# Patient Record
Sex: FEMALE | Race: WHITE | Employment: OTHER | URBAN - NONMETROPOLITAN AREA
[De-identification: names, ages, dates, MRNs, and addresses within clinical notes are randomized per-mention and may not be internally consistent; named-entity substitution may affect disease eponyms.]

---

## 2021-02-23 ENCOUNTER — OFFICE VISIT (OUTPATIENT)
Dept: FAMILY MEDICINE CLINIC | Age: 71
End: 2021-02-23
Payer: MEDICARE

## 2021-02-23 VITALS
HEIGHT: 62 IN | HEART RATE: 65 BPM | OXYGEN SATURATION: 99 % | SYSTOLIC BLOOD PRESSURE: 125 MMHG | BODY MASS INDEX: 38.37 KG/M2 | TEMPERATURE: 97 F | WEIGHT: 208.5 LBS | DIASTOLIC BLOOD PRESSURE: 70 MMHG

## 2021-02-23 DIAGNOSIS — M05.79 RHEUMATOID ARTHRITIS INVOLVING MULTIPLE SITES WITH POSITIVE RHEUMATOID FACTOR (HCC): ICD-10-CM

## 2021-02-23 DIAGNOSIS — K59.1 FUNCTIONAL DIARRHEA: ICD-10-CM

## 2021-02-23 DIAGNOSIS — E11.9 TYPE 2 DIABETES MELLITUS WITHOUT COMPLICATION, WITHOUT LONG-TERM CURRENT USE OF INSULIN (HCC): Primary | ICD-10-CM

## 2021-02-23 DIAGNOSIS — E11.9 TYPE 2 DIABETES MELLITUS WITHOUT COMPLICATION, WITHOUT LONG-TERM CURRENT USE OF INSULIN (HCC): ICD-10-CM

## 2021-02-23 DIAGNOSIS — T78.40XD ALLERGY, SUBSEQUENT ENCOUNTER: ICD-10-CM

## 2021-02-23 DIAGNOSIS — N39.3 STRESS INCONTINENCE OF URINE: ICD-10-CM

## 2021-02-23 DIAGNOSIS — E78.00 HYPERCHOLESTEROLEMIA: ICD-10-CM

## 2021-02-23 DIAGNOSIS — G47.33 OBSTRUCTIVE SLEEP APNEA OF ADULT: ICD-10-CM

## 2021-02-23 DIAGNOSIS — F51.01 PRIMARY INSOMNIA: ICD-10-CM

## 2021-02-23 PROCEDURE — G8510 SCR DEP NEG, NO PLAN REQD: HCPCS | Performed by: INTERNAL MEDICINE

## 2021-02-23 PROCEDURE — 3046F HEMOGLOBIN A1C LEVEL >9.0%: CPT | Performed by: INTERNAL MEDICINE

## 2021-02-23 PROCEDURE — G8417 CALC BMI ABV UP PARAM F/U: HCPCS | Performed by: INTERNAL MEDICINE

## 2021-02-23 PROCEDURE — 99204 OFFICE O/P NEW MOD 45 MIN: CPT | Performed by: INTERNAL MEDICINE

## 2021-02-23 PROCEDURE — 3017F COLORECTAL CA SCREEN DOC REV: CPT | Performed by: INTERNAL MEDICINE

## 2021-02-23 PROCEDURE — 1090F PRES/ABSN URINE INCON ASSESS: CPT | Performed by: INTERNAL MEDICINE

## 2021-02-23 PROCEDURE — G8427 DOCREV CUR MEDS BY ELIG CLIN: HCPCS | Performed by: INTERNAL MEDICINE

## 2021-02-23 PROCEDURE — 1101F PT FALLS ASSESS-DOCD LE1/YR: CPT | Performed by: INTERNAL MEDICINE

## 2021-02-23 PROCEDURE — G8400 PT W/DXA NO RESULTS DOC: HCPCS | Performed by: INTERNAL MEDICINE

## 2021-02-23 PROCEDURE — 2022F DILAT RTA XM EVC RTNOPTHY: CPT | Performed by: INTERNAL MEDICINE

## 2021-02-23 PROCEDURE — G8536 NO DOC ELDER MAL SCRN: HCPCS | Performed by: INTERNAL MEDICINE

## 2021-02-23 RX ORDER — FLUTICASONE PROPIONATE 50 MCG
2 SPRAY, SUSPENSION (ML) NASAL DAILY
COMMUNITY

## 2021-02-23 RX ORDER — SIMVASTATIN 20 MG/1
10 TABLET, FILM COATED ORAL
COMMUNITY

## 2021-02-23 RX ORDER — OXYBUTYNIN CHLORIDE 10 MG/1
10 TABLET, EXTENDED RELEASE ORAL
COMMUNITY

## 2021-02-23 RX ORDER — METHOTREXATE 2.5 MG/1
2.5 TABLET ORAL
COMMUNITY

## 2021-02-23 RX ORDER — ASPIRIN 81 MG/1
81 TABLET ORAL DAILY
COMMUNITY

## 2021-02-23 RX ORDER — PROPRANOLOL HYDROCHLORIDE 10 MG/1
10 TABLET ORAL
COMMUNITY

## 2021-02-23 RX ORDER — METFORMIN HYDROCHLORIDE 500 MG/1
500 TABLET ORAL
COMMUNITY

## 2021-02-23 NOTE — PROGRESS NOTES
Oliverio Cabrera presents today for   Chief Complaint   Patient presents with   [address]       Is someone accompanying this pt? no    Is the patient using any DME equipment during OV? no    Depression Screening:  3 most recent PHQ Screens 2/23/2021   Little interest or pleasure in doing things Not at all   Feeling down, depressed, irritable, or hopeless Several days   Total Score PHQ 2 1       Learning Assessment:  Learning Assessment 2/23/2021   PRIMARY LEARNER Patient   PRIMARY LANGUAGE ENGLISH   LEARNER PREFERENCE PRIMARY DEMONSTRATION   ANSWERED BY Patient   RELATIONSHIP SELF       Fall Risk  No flowsheet data found. ADL  No flowsheet data found. Health Maintenance reviewed and discussed and ordered per Provider. Health Maintenance Due   Topic Date Due    Hepatitis C Screening  1950    Lipid Screen  06/05/1958    COVID-19 Vaccine (1 of 2) 06/05/1964    DTaP/Tdap/Td series (1 - Tdap) 06/05/1969    Shingrix Vaccine Age 50> (1 of 2) 06/05/1998    Colorectal Cancer Screening Combo  06/05/1998    Breast Cancer Screen Mammogram  06/05/1998    GLAUCOMA SCREENING Q2Y  06/05/2013    Bone Densitometry (Dexa) Screening  06/05/2013    Pneumococcal 65+ years (1 of 1 - PPSV23) 06/05/2013    Flu Vaccine (1) 09/01/2020    Medicare Yearly Exam  02/10/2021   . Coordination of Care:  1. Have you been to the ER, urgent care clinic since your last visit? Hospitalized since your last visit? No    2. Have you seen or consulted any other health care providers outside of the [address] [place] [address] since your last visit? Include any pap smears or colon screening.    No

## 2021-02-23 NOTE — PROGRESS NOTES
Subjective:   Jackalyn Gottron is a 65 y.o. female who was seen for Establish Care    HPI   Here to establish care, and get med refills. Doing well with diabetes and RA. Questioin of why on several meds. Home Medications    Medication Sig Start Date End Date Taking? Authorizing Provider   metFORMIN (GLUCOPHAGE) 500 mg tablet Take 500 mg by mouth. Yes Provider, Historical   aspirin delayed-release 81 mg tablet Take 81 mg by mouth daily. Yes Provider, Historical   propranoloL (INDERAL) 10 mg tablet Take 10 mg by mouth. Yes Provider, Historical   loratadine 10 mg cap Take 10 mg by mouth. Yes Provider, Historical   methotrexate (RHEUMATREX) 2.5 mg tablet Take 2.5 mg by mouth. Yes Provider, Historical   Cetirizine 10 mg cap Take 10 mg by mouth. Yes Provider, Historical   simvastatin (ZOCOR) 20 mg tablet Take 10 mg by mouth. Yes Provider, Historical   oxybutynin chloride XL (DITROPAN XL) 10 mg CR tablet Take 10 mg by mouth. Yes Provider, Historical   fluticasone propionate (FLONASE) 50 mcg/actuation nasal spray 2 Sprays by Both Nostrils route daily. Yes Provider, Historical      Allergies   Allergen Reactions    Hydrocodone-Acetaminophen Anxiety     Social History     Tobacco Use    Smoking status: Never Smoker    Smokeless tobacco: Never Used   Substance Use Topics    Alcohol use: Not Currently    Drug use: Never            Review of Systems   Constitutional: Negative for activity change, appetite change and fatigue. HENT: Negative. Cardiovascular: Negative. Gastrointestinal: Positive for diarrhea. Negative for abdominal distention. Genitourinary: Positive for frequency. Musculoskeletal: Positive for arthralgias and myalgias. Neurological: Negative. Physical Exam  Constitutional:       Appearance: Normal appearance. HENT:      Head: Normocephalic. Neck:      Musculoskeletal: Normal range of motion. Cardiovascular:      Rate and Rhythm: Normal rate.       Heart sounds: Normal heart sounds. Pulmonary:      Effort: Pulmonary effort is normal.      Breath sounds: Normal breath sounds. Abdominal:      General: Abdomen is flat. Musculoskeletal: Normal range of motion. General: Deformity present. Skin:     General: Skin is warm. Neurological:      General: No focal deficit present. Mental Status: She is alert. Objective:     Visit Vitals  /70 (BP 1 Location: Left upper arm, BP Patient Position: Sitting)   Pulse 65   Temp 97 °F (36.1 °C)   Ht 5' 2\" (1.575 m)   Wt 208 lb 8 oz (94.6 kg)   SpO2 99%   BMI 38.14 kg/m²      alert, cooperative, no distress   normal mood, behavior, speech, dress, motor activity, and thought processes, able to follow commands          Assessment & Plan:     1. Type 2 diabetes mellitus without complication, without long-term current use of insulin (HCC)  Her diabetes been relatively well controlled on Metformin. We will go ahead and obtain labs to see how she is doing. She is on no ACE inhibitor at this point but may need to start on this in the future. Currently also being treated with her statins as well as aspirin.  - HEMOGLOBIN A1C WITH EAG; Future  - LIPID PANEL; Future  - METABOLIC PANEL, COMPREHENSIVE; Future  - MICROALBUMIN, UR, RAND W/ MICROALB/CREAT RATIO; Future    2. Rheumatoid arthritis involving multiple sites with positive rheumatoid factor ([facility] [address]  This is been well managed via rheumatoid rheumatologist.  Noted labs will be obtained. - C REACTIVE PROTEIN, QT  - SED RATE (ESR); Future  - CBC WITH AUTOMATED DIFF; Future    3. Hypercholesterolemia  This is stable on her current simvastatin. - LIPID PANEL; Future    4. Allergy, subsequent encounter  She is taking over-the-counter as well as noted meds. 5. Stress incontinence of urine  This is been well controlled on her oxybutynin.     6. Functional diarrhea  This is likely due to multiplicity of reasons possibly Metformin diet as well as certain bowel dysfunction. She has a functional component to her diarrhea. We talked about limiting stress with better exercise and sleep. 7. Primary insomnia  We talked that she could use the diphenhydramine but this needs to be watched carefully to its side effect profile. This is worked better than trazodone or other sources. 8. Obstructive sleep apnea of adult  Currently stable and is following up with pulmonologist.        I spent at least 45 minutes on this visit with this new patient. [phone]    Additional exam findings: We discussed the expected course, resolution and complications of the diagnosis(es) in detail. Medication risks, benefits, costs, interactions, and alternatives were discussed as indicated. I advised her to contact the office if her condition worsens, changes or fails to improve as anticipated. She expressed understanding with the diagnosis(es) and plan.

## 2021-02-23 NOTE — PATIENT INSTRUCTIONS
Wean off propanolol over 2 weeks, cutting in half  Eating metformin, with food. Limit magnesium       Learning About Physical Activity  What is physical activity? Physical activity is any kind of activity that gets your body moving. The types of physical activity that can help you get fit and stay healthy include:  · Aerobic or \"cardio\" activities that make your heart beat faster and make you breathe harder, such as brisk walking, riding a bike, or running. Aerobic activities strengthen your heart and lungs and build up your endurance. · Strength training activities that make your muscles work against, or \"resist,\" something, such as lifting weights or doing push-ups. These activities help tone and strengthen your muscles. · Stretches that allow you to move your joints and muscles through their full range of motion. Stretching helps you be more flexible and avoid injury. What are the benefits of physical activity? Being active is one of the best things you can do for your health. It helps you to:  · Feel stronger and have more energy to do all the things you like to do. · Focus better at school or work. · Feel, think, and sleep better. · Reach and stay at a healthy weight. · Lose fat and build lean muscle. · Lower your risk for serious health problems. · Keep your bones, muscles, and joints strong. How can you make physical activity part of your life? Get at least 30 minutes of exercise on most days of the week. Walking is a good choice. You also may want to do other activities, such as running, swimming, cycling, or playing tennis or team sports. Pick activities that you like--ones that make your heart beat faster, your muscles stronger, and your muscles and joints more flexible. If you find more than one thing you like doing, do them all. You don't have to do the same thing every day. Get your heart pumping every day.  Any activity that makes your heart beat faster and keeps it at that rate for a while counts. Here are some great ways to get your heart beating faster:  · Go for a brisk walk, run, or bike ride. · Go for a hike or swim. · Go in-line skating. · Play a game of touch football, basketball, or soccer. · Ride a bike. · Play tennis or racquetball. · Climb stairs. Even some household chores can be aerobic--just do them at a faster pace. Vacuuming, raking or mowing the lawn, sweeping the garage, and washing and waxing the car all can help get your heart rate up. Strengthen your muscles during the week. You don't have to lift heavy weights or grow big, bulky muscles to get stronger. Doing a few simple activities that make your muscles work against, or \"resist,\" something can help you get stronger. For example, you can:  · Do push-ups or sit-ups, which use your own body weight as resistance. · Lift weights or dumbbells or use stretch bands at home or in a gym or community center. Stretch your muscles often. Stretching will help you as you become more active. It can help you stay flexible, loosen tight muscles, and avoid injury. It can also help improve your balance and posture and can be a great way to relax. Be sure to stretch the muscles you'll be using when you work out. It's best to warm your muscles slightly before you stretch them. Walk or do some other light aerobic activity for a few minutes, and then start stretching. When you stretch your muscles:  · Do it slowly. Stretching is not about going fast or making sudden movements. · Don't push or bounce during a stretch. · Hold each stretch for at least 15 to 30 seconds, if you can. You should feel a stretch in the muscle, but not pain. · Breathe out as you do the stretch. Then breathe in as you hold the stretch. Don't hold your breath. If you're worried about how more activity might affect your health, have a checkup before you start. Follow any special advice your doctor gives you for getting a smart start.   Where can you learn more?  Go to http://www.gray.com/  Enter C982 in the search box to learn more about \"Learning About Physical Activity. \"  Current as of: January 16, 2020               Content Version: 12.6  © 9690-4455 [employer]. Care instructions adapted under license by [employer] (which disclaims liability or warranty for this information). If you have questions about a medical condition or this instruction, always ask your healthcare professional. [place] [address] any warranty or liability for your use of this information.

## 2021-02-24 ENCOUNTER — HOSPITAL ENCOUNTER (OUTPATIENT)
Dept: LAB | Age: 71
Discharge: HOME | End: 2021-02-24
Payer: MEDICARE

## 2021-02-24 LAB
ALBUMIN SERPL-MCNC: 3.8 G/DL (ref 3.5–4.7)
ALBUMIN/GLOB SERPL: 1.2 {RATIO}
ALP SERPL-CCNC: 66 U/L (ref 38–126)
ALT SERPL-CCNC: 78 U/L (ref 3–52)
ANION GAP SERPL CALC-SCNC: 11 MMOL/L
AST SERPL W P-5'-P-CCNC: 60 U/L (ref 14–74)
BASOPHILS # BLD: 0.1 K/UL (ref 0–0.1)
BASOPHILS NFR BLD: 1 % (ref 0–2)
BILIRUB SERPL-MCNC: 0.6 MG/DL (ref 0.2–1)
BUN SERPL-MCNC: 17 MG/DL (ref 9–21)
BUN/CREAT SERPL: 21
CA-I BLD-MCNC: 9 MG/DL (ref 8.5–10.5)
CHLORIDE SERPL-SCNC: 101 MMOL/L (ref 94–111)
CHOLEST SERPL-MCNC: 138 MG/DL
CO2 SERPL-SCNC: 25 MMOL/L (ref 21–33)
CREAT SERPL-MCNC: 0.8 MG/DL (ref 0.7–1.2)
CRP SERPL-MCNC: 0.7 MG/DL (ref 0–1)
EOSINOPHIL # BLD: 0.2 K/UL (ref 0–0.4)
EOSINOPHIL NFR BLD: 3 % (ref 0–5)
ERYTHROCYTE [DISTWIDTH] IN BLOOD BY AUTOMATED COUNT: 14.7 % (ref 11.6–14.5)
ERYTHROCYTE [SEDIMENTATION RATE] IN BLOOD: 37 MM/HR
GLOBULIN SER CALC-MCNC: 3.3 G/DL
GLUCOSE SERPL-MCNC: 129 MG/DL (ref 70–110)
HCT VFR BLD AUTO: 38.1 % (ref 35–45)
HDLC SERPL-MCNC: 53 MG/DL
HDLC SERPL: 2.6 {RATIO} (ref 0–5)
HGB BLD-MCNC: 12.4 G/DL (ref 12–16)
IMM GRANULOCYTES # BLD AUTO: 0 K/UL
IMM GRANULOCYTES NFR BLD AUTO: 0 %
LDLC SERPL CALC-MCNC: 58.2 MG/DL (ref 0–100)
LIPID PROFILE,FLP: NORMAL
LYMPHOCYTES # BLD: 1.6 K/UL (ref 0.9–3.6)
LYMPHOCYTES NFR BLD: 26 % (ref 21–52)
MCH RBC QN AUTO: 33.2 PG (ref 24–34)
MCHC RBC AUTO-ENTMCNC: 32.5 G/DL (ref 31–37)
MCV RBC AUTO: 102.1 FL (ref 74–97)
MONOCYTES # BLD: 0.7 K/UL (ref 0.05–1.2)
MONOCYTES NFR BLD: 11 % (ref 3–10)
NEUTS SEG # BLD: 3.6 K/UL (ref 1.8–8)
NEUTS SEG NFR BLD: 59 % (ref 40–73)
PLATELET # BLD AUTO: 276 K/UL (ref 135–420)
PMV BLD AUTO: 11.4 FL (ref 9.2–11.8)
POTASSIUM SERPL-SCNC: 4.1 MMOL/L (ref 3.2–5.1)
PROT SERPL-MCNC: 7.1 G/DL (ref 6.1–8.4)
RBC # BLD AUTO: 3.73 M/UL (ref 4.2–5.3)
SODIUM SERPL-SCNC: 137 MMOL/L (ref 135–145)
TRIGL SERPL-MCNC: 134 MG/DL (ref ?–150)
VLDLC SERPL CALC-MCNC: 26.8 MG/DL
WBC # BLD AUTO: 6.1 K/UL (ref 4.6–13.2)

## 2021-02-24 PROCEDURE — 86140 C-REACTIVE PROTEIN: CPT

## 2021-02-24 PROCEDURE — 80061 LIPID PANEL: CPT

## 2021-02-24 PROCEDURE — 36415 COLL VENOUS BLD VENIPUNCTURE: CPT

## 2021-02-24 PROCEDURE — 85651 RBC SED RATE NONAUTOMATED: CPT

## 2021-02-24 PROCEDURE — 83036 HEMOGLOBIN GLYCOSYLATED A1C: CPT

## 2021-02-24 PROCEDURE — 85025 COMPLETE CBC W/AUTO DIFF WBC: CPT

## 2021-02-24 PROCEDURE — 82043 UR ALBUMIN QUANTITATIVE: CPT

## 2021-02-24 PROCEDURE — 80053 COMPREHEN METABOLIC PANEL: CPT

## 2021-02-25 LAB
CREAT UR-MCNC: 117 MG/DL
EST. AVERAGE GLUCOSE BLD GHB EST-MCNC: 143 MG/DL
HBA1C MFR BLD: 6.6 % (ref 4–5.6)
MICROALBUMIN UR-MCNC: 4.76 MG/DL
MICROALBUMIN/CREAT UR-RTO: 41 MGMALB/GCRE (ref 0–30)

## 2021-03-23 ENCOUNTER — OFFICE VISIT (OUTPATIENT)
Dept: FAMILY MEDICINE CLINIC | Age: 71
End: 2021-03-23
Payer: MEDICARE

## 2021-03-23 VITALS
HEIGHT: 62 IN | RESPIRATION RATE: 18 BRPM | DIASTOLIC BLOOD PRESSURE: 84 MMHG | WEIGHT: 210 LBS | BODY MASS INDEX: 38.64 KG/M2 | HEART RATE: 80 BPM | SYSTOLIC BLOOD PRESSURE: 127 MMHG | OXYGEN SATURATION: 97 %

## 2021-03-23 DIAGNOSIS — Z13.31 SCREENING FOR DEPRESSION: ICD-10-CM

## 2021-03-23 DIAGNOSIS — Z00.00 WELCOME TO MEDICARE PREVENTIVE VISIT: ICD-10-CM

## 2021-03-23 DIAGNOSIS — L57.0 ACTINIC KERATOSIS: ICD-10-CM

## 2021-03-23 DIAGNOSIS — Z13.39 SCREENING FOR ALCOHOLISM: ICD-10-CM

## 2021-03-23 DIAGNOSIS — Z12.11 SCREEN FOR COLON CANCER: ICD-10-CM

## 2021-03-23 DIAGNOSIS — Z00.00 MEDICARE ANNUAL WELLNESS VISIT, SUBSEQUENT: Primary | ICD-10-CM

## 2021-03-23 PROCEDURE — G8417 CALC BMI ABV UP PARAM F/U: HCPCS | Performed by: INTERNAL MEDICINE

## 2021-03-23 PROCEDURE — 99213 OFFICE O/P EST LOW 20 MIN: CPT | Performed by: INTERNAL MEDICINE

## 2021-03-23 PROCEDURE — G8427 DOCREV CUR MEDS BY ELIG CLIN: HCPCS | Performed by: INTERNAL MEDICINE

## 2021-03-23 PROCEDURE — 3017F COLORECTAL CA SCREEN DOC REV: CPT | Performed by: INTERNAL MEDICINE

## 2021-03-23 PROCEDURE — G8536 NO DOC ELDER MAL SCRN: HCPCS | Performed by: INTERNAL MEDICINE

## 2021-03-23 PROCEDURE — G8510 SCR DEP NEG, NO PLAN REQD: HCPCS | Performed by: INTERNAL MEDICINE

## 2021-03-23 PROCEDURE — 1101F PT FALLS ASSESS-DOCD LE1/YR: CPT | Performed by: INTERNAL MEDICINE

## 2021-03-23 PROCEDURE — G8400 PT W/DXA NO RESULTS DOC: HCPCS | Performed by: INTERNAL MEDICINE

## 2021-03-23 PROCEDURE — G0439 PPPS, SUBSEQ VISIT: HCPCS | Performed by: INTERNAL MEDICINE

## 2021-03-23 NOTE — PATIENT INSTRUCTIONS
Medicare Wellness Visit, Female The best way to live healthy is to have a lifestyle where you eat a well-balanced diet, exercise regularly, limit alcohol use, and quit all forms of tobacco/nicotine, if applicable. Regular preventive services are another way to keep healthy. Preventive services (vaccines, screening tests, monitoring & exams) can help personalize your care plan, which helps you manage your own care. Screening tests can find health problems at the earliest stages, when they are easiest to treat. [facility] follows the current, evidence-based guidelines published by the [facility] ([facility]) when recommending preventive services for our patients. Because we follow these guidelines, sometimes recommendations change over time as research supports it. (For example, mammograms used to be recommended annually. Even though Medicare will still pay for an annual mammogram, the newer guidelines recommend a mammogram every two years for women of average risk). Of course, you and your doctor may decide to screen more often for some diseases, based on your risk and your co-morbidities (chronic disease you are already diagnosed with). Preventive services for you include: - Medicare offers their members a free annual wellness visit, which is time for you and your primary care provider to discuss and plan for your preventive service needs. Take advantage of this benefit every year! 
-All adults over the age of 72 should receive the recommended pneumonia vaccines. Current USPSTF guidelines recommend a series of two vaccines for the best pneumonia protection.  
-All adults should have a flu vaccine yearly and a tetanus vaccine every 10 years.  
-All adults age 48 and older should receive the shingles vaccines (series of two vaccines).      
-All adults age 38-68 who are overweight should have a diabetes screening test once every three years.  
-All adults born between [birth year] and 1965 should be screened once for Hepatitis C. 
-Other screening tests and preventive services for persons with diabetes include: an eye exam to screen for diabetic retinopathy, a kidney function test, a foot exam, and stricter control over your cholesterol.  
-Cardiovascular screening for adults with routine risk involves an electrocardiogram (ECG) at intervals determined by your doctor.  
-Colorectal cancer screenings should be done for adults age 54-65 with no increased risk factors for colorectal cancer. There are a number of acceptable methods of screening for this type of cancer. Each test has its own benefits and drawbacks. Discuss with your doctor what is most appropriate for you during your annual wellness visit. The different tests include: colonoscopy (considered the best screening method), a fecal occult blood test, a fecal DNA test, and sigmoidoscopy. 
 
-A bone mass density test is recommended when a woman turns 65 to screen for osteoporosis. This test is only recommended one time, as a screening. Some providers will use this same test as a disease monitoring tool if you already have osteoporosis. -Breast cancer screenings are recommended every other year for women of normal risk, age 54-69. 
-Cervical cancer screenings for women over age 72 are only recommended with certain risk factors. Here is a list of your current Health Maintenance items (your personalized list of preventive services) with a due date: 
Health Maintenance Due Topic Date Due  
 Hepatitis C Test  Never done  Diabetic Foot Care  Never done  Eye Exam  Never done  COVID-19 Vaccine (1) Never done  DTaP/Tdap/Td  (1 - Tdap) Never done  Shingles Vaccine (1 of 2) Never done  Colorectal Screening  Never done  Mammogram  Never done  Bone Mineral Density   Never done  Pneumococcal Vaccine (1 of 1 - PPSV23) Never done  Yearly Flu Vaccine (1) Never done [facility] Annual Well Visit Never done Medicare Wellness Visit, Female The best way to live healthy is to have a lifestyle where you eat a well-balanced diet, exercise regularly, limit alcohol use, and quit all forms of tobacco/nicotine, if applicable. Regular preventive services are another way to keep healthy. Preventive services (vaccines, screening tests, monitoring & exams) can help personalize your care plan, which helps you manage your own care. Screening tests can find health problems at the earliest stages, when they are easiest to treat. [facility] follows the current, evidence-based guidelines published by the [facility] ([facility]) when recommending preventive services for our patients. Because we follow these guidelines, sometimes recommendations change over time as research supports it. (For example, mammograms used to be recommended annually. Even though Medicare will still pay for an annual mammogram, the newer guidelines recommend a mammogram every two years for women of average risk). Of course, you and your doctor may decide to screen more often for some diseases, based on your risk and your co-morbidities (chronic disease you are already diagnosed with). Preventive services for you include: - Medicare offers their members a free annual wellness visit, which is time for you and your primary care provider to discuss and plan for your preventive service needs. Take advantage of this benefit every year! 
-All adults over the age of 72 should receive the recommended pneumonia vaccines. Current USPSTF guidelines recommend a series of two vaccines for the best pneumonia protection.  
-All adults should have a flu vaccine yearly and a tetanus vaccine every 10 years.  
-All adults age 48 and older should receive the shingles vaccines (series of two vaccines).      
-All adults age 38-68 who are overweight should have a diabetes screening test once every three years.  
-All adults born between [birth year] and 1967 should be screened once for Hepatitis C. 
-Other screening tests and preventive services for persons with diabetes include: an eye exam to screen for diabetic retinopathy, a kidney function test, a foot exam, and stricter control over your cholesterol.  
-Cardiovascular screening for adults with routine risk involves an electrocardiogram (ECG) at intervals determined by your doctor.  
-Colorectal cancer screenings should be done for adults age 54-65 with no increased risk factors for colorectal cancer. There are a number of acceptable methods of screening for this type of cancer. Each test has its own benefits and drawbacks. Discuss with your doctor what is most appropriate for you during your annual wellness visit. The different tests include: colonoscopy (considered the best screening method), a fecal occult blood test, a fecal DNA test, and sigmoidoscopy. 
 
-A bone mass density test is recommended when a woman turns 65 to screen for osteoporosis. This test is only recommended one time, as a screening. Some providers will use this same test as a disease monitoring tool if you already have osteoporosis. -Breast cancer screenings are recommended every other year for women of normal risk, age 54-69. 
-Cervical cancer screenings for women over age 72 are only recommended with certain risk factors. Here is a list of your current Health Maintenance items (your personalized list of preventive services) with a due date: 
Health Maintenance Due Topic Date Due  
 Hepatitis C Test  Never done  Diabetic Foot Care  Never done  Eye Exam  Never done  COVID-19 Vaccine (1) Never done  DTaP/Tdap/Td  (1 - Tdap) Never done  Shingles Vaccine (1 of 2) Never done  Colorectal Screening  Never done  Mammogram  Never done  Bone Mineral Density   Never done  Pneumococcal Vaccine (1 of 1 - PPSV23) Never done  Yearly Flu Vaccine (1) Never done

## 2021-03-23 NOTE — PROGRESS NOTES
Subjective:   Kyle Barber is a 65 y.o. female who was seen for Annual Wellness Visit    HPI   Here for preventative and follow up of labs. Noted lesion on forehead, not going away. Some itching in scalp. Nail on right thumb is seperating. Home Medications    Medication Sig Start Date End Date Taking? Authorizing Provider   metFORMIN (GLUCOPHAGE) 500 mg tablet Take 500 mg by mouth. Provider, Historical   aspirin delayed-release 81 mg tablet Take 81 mg by mouth daily. Provider, Historical   propranoloL (INDERAL) 10 mg tablet Take 10 mg by mouth. Provider, Historical   loratadine 10 mg cap Take 10 mg by mouth. Provider, Historical   methotrexate (RHEUMATREX) 2.5 mg tablet Take 2.5 mg by mouth. Provider, Historical   Cetirizine 10 mg cap Take 10 mg by mouth. Provider, Historical   simvastatin (ZOCOR) 20 mg tablet Take 10 mg by mouth. Provider, Historical   oxybutynin chloride XL (DITROPAN XL) 10 mg CR tablet Take 10 mg by mouth. Provider, Historical   fluticasone propionate (FLONASE) 50 mcg/actuation nasal spray 2 Sprays by Both Nostrils route daily. Provider, Historical      Allergies   Allergen Reactions    Hydrocodone-Acetaminophen Anxiety     Social History     Tobacco Use    Smoking status: Never Smoker    Smokeless tobacco: Never Used   Substance Use Topics    Alcohol use: Not Currently    Drug use: Never            Review of Systems   Constitutional: Negative. HENT: Negative. Eyes: Negative. Respiratory: Negative. Gastrointestinal: Negative. Musculoskeletal: Negative. Psychiatric/Behavioral: Negative. All other systems reviewed and are negative. Physical Exam  Vitals signs and nursing note reviewed. HENT:      Head: Normocephalic. Nose: Nose normal.   Pulmonary:      Effort: Pulmonary effort is normal.      Breath sounds: Normal breath sounds. Neurological:      General: No focal deficit present.       Mental Status: She is alert and oriented to person, place, and time. skin on forehead with red keratin  Objective: There were no vitals taken for this visit. alert, cooperative, no distress   normal mood, behavior, speech, dress, motor activity, and thought processes, able to follow commands          Assessment & Plan:     1. Welcome to Medicare preventive visit      2. Screening for alcoholism  Negative  - [place] ANNUAL ALCOHOL SCREEN 15 MIN    3. Screening for depression  Negative  - DEPRESSION SCREEN ANNUAL    4. Screen for colon cancer  Cologuard last year  - REFERRAL FOR COLONOSCOPY    5. Medicare annual wellness visit, subsequent  Normal    6. Actinic keratosis  Nitrogen applied. Patient is to continue to observe her skin particular on her scalp as there are several other lesions it could possibly turn into something concerning. I spent at least 23 minutes on this visit with this established patient. [phone]    Additional exam findings: We discussed the expected course, resolution and complications of the diagnosis(es) in detail. Medication risks, benefits, costs, interactions, and alternatives were discussed as indicated. I advised her to contact the office if her condition worsens, changes or fails to improve as anticipated. She expressed understanding with the diagnosis(es) and plan. This is the Subsequent Medicare Annual Wellness Exam, performed 12 months or more after the Initial AWV or the last Subsequent AWV    I have reviewed the patient's medical history in detail and updated the computerized patient record.      Depression Risk Factor Screening:     3 most recent PHQ Screens 3/23/2021   Little interest or pleasure in doing things Not at all   Feeling down, depressed, irritable, or hopeless Not at all   Total Score PHQ 2 0       Alcohol Risk Screen    Do you average more than 1 drink per night or more than 7 drinks a week:  No    On any one occasion in the past three months have you have had more than 3 drinks containing alcohol:  No        Functional Ability and Level of Safety:    Hearing: Hearing is good. Activities of Daily Living: The home contains: no safety equipment. Patient does total self care      Ambulation: with no difficulty     Fall Risk:  Fall Risk Assessment, last 12 mths 3/23/2021   Able to walk? Yes   Fall in past 12 months? 0   Do you feel unsteady? 0   Are you worried about falling 0      Abuse Screen:  Patient is not abused       Cognitive Screening    Has your family/caregiver stated any concerns about your memory: no     Cognitive Screening: Normal - Verbal Fluency Test    Assessment/Plan   Education and counseling provided:  Are appropriate based on today's review and evaluation    Diagnoses and all orders for this visit:    1. Medicare annual wellness visit, subsequent    2. Welcome to Medicare preventive visit    3. Screening for alcoholism    4. Screening for depression  -     DEPRESSION SCREEN ANNUAL    5. Screen for colon cancer  -     REFERRAL FOR COLONOSCOPY    Other orders  -     [place] ANNUAL ALCOHOL SCREEN 15 MIN        Health Maintenance Due     Health Maintenance Due   Topic Date Due    Hepatitis C Screening  Never done    Foot Exam Q1  Never done    Eye Exam Retinal or Dilated  Never done    COVID-19 Vaccine (1) Never done    DTaP/Tdap/Td series (1 - Tdap) Never done    Shingrix Vaccine Age 50> (1 of 2) Never done    Colorectal Cancer Screening Combo  Never done    Breast Cancer Screen Mammogram  Never done    Bone Densitometry (Dexa) Screening  Never done    Pneumococcal 65+ years (1 of 1 - PPSV23) Never done    Flu Vaccine (1) Never done       Patient Care Team   Patient Care Team:  Leah Alicia MD as PCP - General (Internal Medicine)    History   There is no problem list on file for this patient. No past medical history on file. History reviewed. No pertinent surgical history.   Current Outpatient Medications   Medication Sig Dispense Refill    metFORMIN (GLUCOPHAGE) 500 mg tablet Take 500 mg by mouth.  aspirin delayed-release 81 mg tablet Take 81 mg by mouth daily.  propranoloL (INDERAL) 10 mg tablet Take 10 mg by mouth.  methotrexate (RHEUMATREX) 2.5 mg tablet Take 2.5 mg by mouth.  simvastatin (ZOCOR) 20 mg tablet Take 10 mg by mouth.  oxybutynin chloride XL (DITROPAN XL) 10 mg CR tablet Take 10 mg by mouth.  fluticasone propionate (FLONASE) 50 mcg/actuation nasal spray 2 Sprays by Both Nostrils route daily.  loratadine 10 mg cap Take 10 mg by mouth.  Cetirizine 10 mg cap Take 10 mg by mouth.        Allergies   Allergen Reactions    Hydrocodone-Acetaminophen Anxiety       Family History   Problem Relation Age of Onset    No Known Problems Mother     No Known Problems Father      Social History     Tobacco Use    Smoking status: Never Smoker    Smokeless tobacco: Never Used   Substance Use Topics    Alcohol use: Not Currently

## 2021-03-23 NOTE — ACP (ADVANCE CARE PLANNING)
Advance Care Planning     Advance Care Planning (ACP) Physician/NP/PA Conversation      Date of Conversation: 3/23/2021  Conducted with: Patient with [address] Decision Maker:     Click here to complete [place] including selection of the [place] Relationship (ie \"Primary\")  Today we documented Decision Maker(s) consistent with Legal Next of Kin hierarchy. Care Preferences:    Hospitalization: \"If your health worsens and it becomes clear that your chance of recovery is unlikely, what would be your preference regarding hospitalization? \"  The patient would prefer hospitalization. Ventilation: \"If you were unable to breathe on your own and your chance of recovery was unlikely, what would be your preference about the use of a ventilator (breathing machine) if it was available to you? \"   The patient would desire the use of a ventilator. Resuscitation: \"In the event your heart stopped as a result of an underlying serious health condition, would you want attempts to be made to restart your heart, or would you prefer a natural death? \"   Yes, attempt to resuscitate.     Additional topics discussed: treatment goals    Conversation Outcomes / Follow-Up Plan:   ACP complete - no further action today  Reviewed DNR/DNI and patient elects Full Code (Attempt Resuscitation)     Length of Voluntary ACP Conversation in minutes:  <16 minutes (Non-Billable)    Karrie Cope MD

## 2021-03-23 NOTE — PROGRESS NOTES
This is a \"Welcome to [employer]"  Initial Preventive Physical Examination (IPPE) providing Personalized Prevention Plan Services (Performed in the first 12 months of enrollment)    I have reviewed the patient's medical history in detail and updated the computerized patient record. Depression Risk Screen     3 most recent PHQ Screens 3/23/2021   Little interest or pleasure in doing things Not at all   Feeling down, depressed, irritable, or hopeless Not at all   Total Score PHQ 2 0       Alcohol Risk Screen    Do you average more than 1 drink per night or more than 7 drinks a week:  No    On any one occasion in the past three months have you have had more than 3 drinks containing alcohol:  No          Functional Ability and Level of Safety    Diet: No special diet      Hearing: Hearing is good. Vision Screening:  Vision is good. No exam data present      Activities of Daily Living: The home contains: handrails and grab bars  Patient does total self care      Ambulation: with no difficulty      Exercise level: moderately active     Fall Risk Screen:  Fall Risk Assessment, last 12 mths 3/23/2021   Able to walk? Yes   Fall in past 12 months? 0   Do you feel unsteady? 0   Are you worried about falling 0      Abuse Screen:  Patient is not abused       Screening EKG   EKG order placed: No    End of Life Planning   Advanced care planning directives were not discussed with the patient and/or family/caregiver. Assessment/Plan   Education and counseling provided:  Are appropriate based on today's review and evaluation     Cachorro Maloney presents today for   Chief Complaint   Patient presents with    Annual Wellness Visit       Is someone accompanying this pt? No    Is the patient using any DME equipment during OV?  No    Depression Screening:  3 most recent PHQ Screens 3/23/2021   Little interest or pleasure in doing things Not at all   Feeling down, depressed, irritable, or hopeless Not at all   Total Score PHQ 2 0       Learning Assessment:  Learning Assessment 2/23/2021   PRIMARY LEARNER Patient   PRIMARY LANGUAGE ENGLISH   LEARNER PREFERENCE PRIMARY DEMONSTRATION   ANSWERED BY Patient   RELATIONSHIP SELF       Fall Risk  Fall Risk Assessment, last 12 mths 3/23/2021   Able to walk? Yes   Fall in past 12 months? 0   Do you feel unsteady? 0   Are you worried about falling 0       ADL  No flowsheet data found. Health Maintenance reviewed and discussed and ordered per Provider. Health Maintenance Due   Topic Date Due    Hepatitis C Screening  Never done    Foot Exam Q1  Never done    Eye Exam Retinal or Dilated  Never done    COVID-19 Vaccine (1) Never done    DTaP/Tdap/Td series (1 - Tdap) Never done    Shingrix Vaccine Age 50> (1 of 2) Never done    Colorectal Cancer Screening Combo  Never done    Breast Cancer Screen Mammogram  Never done    Bone Densitometry (Dexa) Screening  Never done    Pneumococcal 65+ years (1 of 1 - PPSV23) Never done    Flu Vaccine (1) Never done    Medicare Yearly Exam  Never done   . Coordination of Care:  1. Have you been to the ER, urgent care clinic since your last visit? Hospitalized since your last visit? No    2. Have you seen or consulted any other health care providers outside of the [address] [place] [address] since your last visit? Include any pap smears or colon screening. Yes,  Rheumatologist, Sleep medicine/pulmanologist?                    Diagnoses and all orders for this visit:    1. Welcome to Medicare preventive visit    2. Screening for alcoholism    3. Screening for depression  -     DEPRESSION SCREEN ANNUAL    4.  Screen for colon cancer  -     REFERRAL FOR COLONOSCOPY    Other orders  -     [place] ANNUAL ALCOHOL SCREEN 15 MIN        Health Maintenance Due     Health Maintenance Due   Topic Date Due    Hepatitis C Screening  Never done    Foot Exam Q1  Never done    Eye Exam Retinal or Dilated  Never done    COVID-19 Vaccine (1) Never done   [place] DTaP/Tdap/Td series (1 - Tdap) Never done    Shingrix Vaccine Age 50> (1 of 2) Never done    Colorectal Cancer Screening Combo  Never done    Breast Cancer Screen Mammogram  Never done    Bone Densitometry (Dexa) Screening  Never done    Pneumococcal 65+ years (1 of 1 - PPSV23) Never done    Flu Vaccine (1) Never done    Medicare Yearly Exam  Never done       Patient Care Team   Patient Care Team:  Cisco Bob MD as PCP - General (Internal Medicine)    History   No past medical history on file. No past surgical history on file. Current Outpatient Medications   Medication Sig Dispense Refill    metFORMIN (GLUCOPHAGE) 500 mg tablet Take 500 mg by mouth.  aspirin delayed-release 81 mg tablet Take 81 mg by mouth daily.  propranoloL (INDERAL) 10 mg tablet Take 10 mg by mouth.  loratadine 10 mg cap Take 10 mg by mouth.  methotrexate (RHEUMATREX) 2.5 mg tablet Take 2.5 mg by mouth.  Cetirizine 10 mg cap Take 10 mg by mouth.  simvastatin (ZOCOR) 20 mg tablet Take 10 mg by mouth.  oxybutynin chloride XL (DITROPAN XL) 10 mg CR tablet Take 10 mg by mouth.  fluticasone propionate (FLONASE) 50 mcg/actuation nasal spray 2 Sprays by Both Nostrils route daily.        Allergies   Allergen Reactions    Hydrocodone-Acetaminophen Anxiety       Family History   Problem Relation Age of Onset    No Known Problems Mother     No Known Problems Father      Social History     Tobacco Use    Smoking status: Never Smoker    Smokeless tobacco: Never Used   Substance Use Topics    Alcohol use: Not Currently

## 2021-03-30 ENCOUNTER — TELEPHONE (OUTPATIENT)
Dept: FAMILY MEDICINE CLINIC | Age: 71
End: 2021-03-30

## 2021-03-30 NOTE — TELEPHONE ENCOUNTER
Called patient and let her know that she doesn't need the colonoscopy per Dr. Diana Chatman. The patient verbalized understanding. I also tried to schedule the patient an appointment for her swelling but she said the swelling isn't an \"all the time thing\" she believes it is just her arthritis flaring up. She said she had an appointment with Dr. Manuela Arias coming up and was unsure if she actually needed an appointment with Dr. Diana Chatman at this point. I told the patient to call us if anything changes. Patient verbalized understanding.

## 2021-03-30 NOTE — TELEPHONE ENCOUNTER
Patient calls regarding the referral for a colonoscopy. She had a cologuard test which was negative about 2 years ago and was told she didn't need a colonoscopy. She would like to know how often she needs to have f/u of cologuard or does she need a colonoscopy at this time. She also has some swelling in her fingers and ankle and wants to know if this is something she should be concerned about.

## 2021-05-03 ENCOUNTER — VIRTUAL VISIT (OUTPATIENT)
Dept: FAMILY MEDICINE CLINIC | Age: 71
End: 2021-05-03
Payer: MEDICARE

## 2021-05-03 DIAGNOSIS — R60.1 GENERALIZED EDEMA: Primary | ICD-10-CM

## 2021-05-03 PROCEDURE — G8400 PT W/DXA NO RESULTS DOC: HCPCS | Performed by: INTERNAL MEDICINE

## 2021-05-03 PROCEDURE — 3017F COLORECTAL CA SCREEN DOC REV: CPT | Performed by: INTERNAL MEDICINE

## 2021-05-03 PROCEDURE — G8536 NO DOC ELDER MAL SCRN: HCPCS | Performed by: INTERNAL MEDICINE

## 2021-05-03 PROCEDURE — G8417 CALC BMI ABV UP PARAM F/U: HCPCS | Performed by: INTERNAL MEDICINE

## 2021-05-03 PROCEDURE — G8510 SCR DEP NEG, NO PLAN REQD: HCPCS | Performed by: INTERNAL MEDICINE

## 2021-05-03 PROCEDURE — 99213 OFFICE O/P EST LOW 20 MIN: CPT | Performed by: INTERNAL MEDICINE

## 2021-05-03 PROCEDURE — 1101F PT FALLS ASSESS-DOCD LE1/YR: CPT | Performed by: INTERNAL MEDICINE

## 2021-05-03 PROCEDURE — 1090F PRES/ABSN URINE INCON ASSESS: CPT | Performed by: INTERNAL MEDICINE

## 2021-05-03 PROCEDURE — G8427 DOCREV CUR MEDS BY ELIG CLIN: HCPCS | Performed by: INTERNAL MEDICINE

## 2021-05-03 RX ORDER — FUROSEMIDE 20 MG/1
TABLET ORAL
Qty: 20 TAB | Refills: 1 | Status: SHIPPED | OUTPATIENT
Start: 2021-05-03

## 2021-05-03 NOTE — PROGRESS NOTES
Hughie Osler presents today for   Chief Complaint   Patient presents with    Follow-up     fluid retention-4 weeks, hands, ankles       Virtual/telephone visit    Depression Screening:  3 most recent PHQ Screens 5/3/2021   Little interest or pleasure in doing things Not at all   Feeling down, depressed, irritable, or hopeless Not at all   Total Score PHQ 2 0       Learning Assessment:  Learning Assessment 2/23/2021   PRIMARY LEARNER Patient   PRIMARY LANGUAGE ENGLISH   LEARNER PREFERENCE PRIMARY DEMONSTRATION   ANSWERED BY Patient   RELATIONSHIP SELF       Fall Risk  Fall Risk Assessment, last 12 mths 3/23/2021   Able to walk? Yes   Fall in past 12 months? 0   Do you feel unsteady? 0   Are you worried about falling 0       ADL  No flowsheet data found. Health Maintenance reviewed and discussed and ordered per Provider. Health Maintenance Due   Topic Date Due    Hepatitis C Screening  Never done    Foot Exam Q1  Never done    Eye Exam Retinal or Dilated  Never done    COVID-19 Vaccine (1) Never done    DTaP/Tdap/Td series (1 - Tdap) Never done    Shingrix Vaccine Age 50> (1 of 2) Never done    Colorectal Cancer Screening Combo  Never done    Breast Cancer Screen Mammogram  Never done    Bone Densitometry (Dexa) Screening  Never done    Pneumococcal 65+ years (1 of 1 - PPSV23) Never done   . Coordination of Care:  1. Have you been to the ER, urgent care clinic since your last visit? Hospitalized since your last visit? No    2. Have you seen or consulted any other health care providers outside of the [address] [place] [address] since your last visit? Include any pap smears or colon screening.  Yes

## 2021-05-03 NOTE — PROGRESS NOTES
Gail Bledsoe (: 1950) is a 65 y.o. female, established patient, here for evaluation of the following chief complaint(s):   Follow-up (fluid retention-4 weeks, hands, ankles)   Swelling in various areas. Hands and feet. Her rheumatologist which to describe no other current issues. She otherwise feeling well and is in medicine no other illness. She is noticing mainly just and has noted her hands and feet. ASSESSMENT/PLAN:  Below is the assessment and plan developed based on review of pertinent labs, studies, and medications. 1. Generalized edema  As noted her last laboratory values were normal.  Her exam via her rheumatologist was normal.  Go ahead and try her a little bit of furosemide for the next few days watch carefully to see that she improves and further work-up if things persist.    No follow-ups on file. SUBJECTIVE/OBJECTIVE:  HPI    Review of Systems     No flowsheet data found. Physical Exam            Gail Bledsoe, was evaluated through a synchronous (real-time) audio-video encounter. The patient (or guardian if applicable) is aware that this is a billable service. Verbal consent to proceed has been obtained within the past 12 months. The visit was conducted pursuant to the emergency declaration under the [facility] [address] authority and the [employer] and [employer] General Act. Patient identification was verified, and a caregiver was present when appropriate. The patient was located in a state where the provider was credentialed to provide care. An electronic signature was used to authenticate this note.   -- Amarjit Sahu MD

## 2021-05-24 RX ORDER — OMEPRAZOLE 20 MG/1
20 CAPSULE, DELAYED RELEASE ORAL DAILY
COMMUNITY
End: 2021-05-24 | Stop reason: SDUPTHER

## 2021-05-24 RX ORDER — OMEPRAZOLE 20 MG/1
20 CAPSULE, DELAYED RELEASE ORAL DAILY
Qty: 90 CAPSULE | Refills: 1 | Status: SHIPPED | OUTPATIENT
Start: 2021-05-24

## 2021-06-02 ENCOUNTER — VIRTUAL VISIT (OUTPATIENT)
Dept: FAMILY MEDICINE CLINIC | Age: 71
End: 2021-06-02
Payer: MEDICARE

## 2021-06-02 DIAGNOSIS — R60.1 GENERALIZED EDEMA: Primary | ICD-10-CM

## 2021-06-02 PROCEDURE — 1101F PT FALLS ASSESS-DOCD LE1/YR: CPT | Performed by: INTERNAL MEDICINE

## 2021-06-02 PROCEDURE — G8510 SCR DEP NEG, NO PLAN REQD: HCPCS | Performed by: INTERNAL MEDICINE

## 2021-06-02 PROCEDURE — 3017F COLORECTAL CA SCREEN DOC REV: CPT | Performed by: INTERNAL MEDICINE

## 2021-06-02 PROCEDURE — 1090F PRES/ABSN URINE INCON ASSESS: CPT | Performed by: INTERNAL MEDICINE

## 2021-06-02 PROCEDURE — G8536 NO DOC ELDER MAL SCRN: HCPCS | Performed by: INTERNAL MEDICINE

## 2021-06-02 PROCEDURE — G8400 PT W/DXA NO RESULTS DOC: HCPCS | Performed by: INTERNAL MEDICINE

## 2021-06-02 PROCEDURE — 99213 OFFICE O/P EST LOW 20 MIN: CPT | Performed by: INTERNAL MEDICINE

## 2021-06-02 PROCEDURE — G8427 DOCREV CUR MEDS BY ELIG CLIN: HCPCS | Performed by: INTERNAL MEDICINE

## 2021-06-02 PROCEDURE — G8417 CALC BMI ABV UP PARAM F/U: HCPCS | Performed by: INTERNAL MEDICINE

## 2021-06-02 NOTE — PROGRESS NOTES
Hina Aguilar presents today for   Chief Complaint   Patient presents with    Medication Evaluation       Virtual/telephone visit    Depression Screening:  3 most recent PHQ Screens 6/2/2021   Little interest or pleasure in doing things Not at all   Feeling down, depressed, irritable, or hopeless Not at all   Total Score PHQ 2 0       Learning Assessment:  Learning Assessment 2/23/2021   PRIMARY LEARNER Patient   PRIMARY LANGUAGE ENGLISH   LEARNER PREFERENCE PRIMARY DEMONSTRATION   ANSWERED BY Patient   RELATIONSHIP SELF       Fall Risk  Fall Risk Assessment, last 12 mths 6/2/2021   Able to walk? Yes   Fall in past 12 months? 0   Do you feel unsteady? 0   Are you worried about falling 0       ADL  No flowsheet data found. Health Maintenance reviewed and discussed and ordered per Provider. Health Maintenance Due   Topic Date Due    Hepatitis C Screening  Never done    Foot Exam Q1  Never done    Eye Exam Retinal or Dilated  Never done    COVID-19 Vaccine (1) Never done    DTaP/Tdap/Td series (1 - Tdap) Never done    Shingrix Vaccine Age 50> (1 of 2) Never done    Colorectal Cancer Screening Combo  Never done    Breast Cancer Screen Mammogram  Never done    Bone Densitometry (Dexa) Screening  Never done    Pneumococcal 65+ years (1 of 1 - PPSV23) Never done   . Coordination of Care:  1. Have you been to the ER, urgent care clinic since your last visit? Hospitalized since your last visit? No    2. Have you seen or consulted any other health care providers outside of the [address] [place] [address] since your last visit? Include any pap smears or colon screening.    Yes

## 2021-06-02 NOTE — PROGRESS NOTES
Channing Marcelo (: 1950) is a 65 y.o. female, established patient, here for evaluation of the following chief complaint(s):   Medication Evaluation   Increased fluid in feet and ankles, only on ankle, no pain and better with elevation, no change with furosemide. No help with compression stockings. No cough or chest discomfort, NO SOB. Patient is not change any of her diet behaviors sleeping positions etc.  Otherwise patient is feeling well and is causing very little discomfort. ASSESSMENT/PLAN:  Below is the assessment and plan developed based on review of pertinent labs, studies, and medications. 1. Generalized edema  Etiology this is possibly just the weather being hot her patient standing or sitting too long. Do not see any other particular etiology that could be of concern such as heart kidneys or liver. We will continue to observe this carefully over the next several weeks if things worsen restarting her compression stockings as well as a work-up for vascular concerns. Patient does have a history of small varicose veins which also could be a complication of this. No follow-ups on file. SUBJECTIVE/OBJECTIVE:  HPI    Review of Systems     No flowsheet data found. Physical Exam    She has normal hearing normal speech good mentation and cognition. Channing Marcelo, was evaluated through a synchronous (real-time) audio-video encounter. The patient (or guardian if applicable) is aware that this is a billable service. Verbal consent to proceed has been obtained within the past 12 months. The visit was conducted pursuant to the emergency declaration under the [facility], [address] [place] [address] authority and the [employer] and [employer] General Act. Patient identification was verified, and a caregiver was present when appropriate. The patient was located in a state where the provider was credentialed to provide care.       An electronic signature was used to authenticate this note.   -- Meño Aguilar MD

## 2021-06-04 ENCOUNTER — TELEPHONE (OUTPATIENT)
Dept: FAMILY MEDICINE CLINIC | Age: 71
End: 2021-06-04

## 2021-06-04 ENCOUNTER — HOSPITAL ENCOUNTER (OUTPATIENT)
Dept: GENERAL RADIOLOGY | Age: 71
Discharge: HOME | End: 2021-06-04
Attending: INTERNAL MEDICINE
Payer: MEDICARE

## 2021-06-04 ENCOUNTER — TRANSCRIBE ORDER (OUTPATIENT)
Dept: REGISTRATION | Age: 71
End: 2021-06-04

## 2021-06-04 DIAGNOSIS — M79.641 RIGHT HAND PAIN: Primary | ICD-10-CM

## 2021-06-04 DIAGNOSIS — M79.641 RIGHT HAND PAIN: ICD-10-CM

## 2021-06-04 DIAGNOSIS — M79.641 PAIN OF RIGHT HAND: Primary | ICD-10-CM

## 2021-06-04 PROCEDURE — 73130 X-RAY EXAM OF HAND: CPT

## 2021-06-04 NOTE — PROGRESS NOTES
Called patient and let her know of her results per Dr. Christina Guzmán. Patient verbalized understanding.

## 2021-06-04 NOTE — TELEPHONE ENCOUNTER
Patient left voicemail on office phone stating she would like a nurse to call her back.  [phone]    Also said she needed a refill on AccuCheck glucose test strips sent to [facility] in [place]

## 2021-06-08 NOTE — TELEPHONE ENCOUNTER
Pharmacy sent a request over for omeprazole. It is too early for a refill.  The patient has them filled on 5/24/21 for 90 days with 1 refill ( 6 months worth)

## 2021-06-22 ENCOUNTER — OFFICE VISIT (OUTPATIENT)
Dept: FAMILY MEDICINE CLINIC | Age: 71
End: 2021-06-22
Payer: MEDICARE

## 2021-06-22 VITALS
DIASTOLIC BLOOD PRESSURE: 60 MMHG | WEIGHT: 205.6 LBS | OXYGEN SATURATION: 97 % | BODY MASS INDEX: 37.6 KG/M2 | HEART RATE: 65 BPM | TEMPERATURE: 98 F | SYSTOLIC BLOOD PRESSURE: 117 MMHG

## 2021-06-22 DIAGNOSIS — R60.1 GENERALIZED EDEMA: ICD-10-CM

## 2021-06-22 DIAGNOSIS — E11.9 TYPE 2 DIABETES MELLITUS WITHOUT COMPLICATION, WITHOUT LONG-TERM CURRENT USE OF INSULIN (HCC): ICD-10-CM

## 2021-06-22 DIAGNOSIS — R60.1 GENERALIZED EDEMA: Primary | ICD-10-CM

## 2021-06-22 PROCEDURE — G8417 CALC BMI ABV UP PARAM F/U: HCPCS | Performed by: INTERNAL MEDICINE

## 2021-06-22 PROCEDURE — 1090F PRES/ABSN URINE INCON ASSESS: CPT | Performed by: INTERNAL MEDICINE

## 2021-06-22 PROCEDURE — G8536 NO DOC ELDER MAL SCRN: HCPCS | Performed by: INTERNAL MEDICINE

## 2021-06-22 PROCEDURE — 2022F DILAT RTA XM EVC RTNOPTHY: CPT | Performed by: INTERNAL MEDICINE

## 2021-06-22 PROCEDURE — G8432 DEP SCR NOT DOC, RNG: HCPCS | Performed by: INTERNAL MEDICINE

## 2021-06-22 PROCEDURE — 1101F PT FALLS ASSESS-DOCD LE1/YR: CPT | Performed by: INTERNAL MEDICINE

## 2021-06-22 PROCEDURE — G8427 DOCREV CUR MEDS BY ELIG CLIN: HCPCS | Performed by: INTERNAL MEDICINE

## 2021-06-22 PROCEDURE — 3044F HG A1C LEVEL LT 7.0%: CPT | Performed by: INTERNAL MEDICINE

## 2021-06-22 PROCEDURE — 3017F COLORECTAL CA SCREEN DOC REV: CPT | Performed by: INTERNAL MEDICINE

## 2021-06-22 PROCEDURE — G8400 PT W/DXA NO RESULTS DOC: HCPCS | Performed by: INTERNAL MEDICINE

## 2021-06-22 PROCEDURE — 99214 OFFICE O/P EST MOD 30 MIN: CPT | Performed by: INTERNAL MEDICINE

## 2021-06-22 NOTE — PROGRESS NOTES
Subjective:   Gail Bledsoe is a 66 y.o. female who was seen for Follow-up (ankle swelling, she said she talked to you on the phone , and she took the lasix and had to potty alot the first day but then it stopped,)    HPI   Patient continues to have swelling in her legs. Limited help from the Lasix. Using her compression stockings. Exercise several times a week. Medications have not changed. Arthritis is still under control with her current medications. Patient is somewhat immobile though due to her pain from her arthritis. Home Medications    Medication Sig Start Date End Date Taking? Authorizing Provider   baricitinib (Olumiant) 2 mg tablet Olumiant 2 mg tablet   Take 1 tablet every day by oral route. Provider, Historical   omeprazole (PRILOSEC) 20 mg capsule Take 1 Capsule by mouth daily. 5/24/21   Petra Christopher MD   furosemide (LASIX) 20 mg tablet Use daily for less a week. 5/3/21   Petra Christopher MD   metFORMIN (GLUCOPHAGE) 500 mg tablet Take 500 mg by mouth. Provider, Historical   aspirin delayed-release 81 mg tablet Take 81 mg by mouth daily. Provider, Historical   propranoloL (INDERAL) 10 mg tablet Take 10 mg by mouth. Provider, Historical   loratadine 10 mg cap Take 10 mg by mouth. Provider, Historical   methotrexate (RHEUMATREX) 2.5 mg tablet Take 2.5 mg by mouth. Provider, Historical   Cetirizine 10 mg cap Take 10 mg by mouth. Provider, Historical   simvastatin (ZOCOR) 20 mg tablet Take 10 mg by mouth. Provider, Historical   oxybutynin chloride XL (DITROPAN XL) 10 mg CR tablet Take 10 mg by mouth. Provider, Historical   fluticasone propionate (FLONASE) 50 mcg/actuation nasal spray 2 Sprays by Both Nostrils route daily.     Provider, Historical      Allergies   Allergen Reactions    Hydrocodone-Acetaminophen Anxiety     Social History     Tobacco Use    Smoking status: Never Smoker    Smokeless tobacco: Never Used   Vaping Use    Vaping Use: Never used Substance Use Topics    Alcohol use: Not Currently    Drug use: Never            Review of Systems   Negative for fever chills chest pain shortness of breath no orthopnea or PND. Physical Exam noted varicose veins throughout her legs some are very large as well as others that are smaller. She has 1+ nonpitting edema. Cardiovascular is regular rate and rhythm pulses otherwise normal.  She has arthritic nodules on the base of both thumbs. Neurologically she is intact. Objective:     Visit Vitals  /60   Pulse 65   Temp 98 °F (36.7 °C)   Wt 205 lb 9.6 oz (93.3 kg)   SpO2 97%   BMI 37.60 kg/m²      alert, cooperative, no distress   normal mood, behavior, speech, dress, motor activity, and thought processes, able to follow commands          Assessment & Plan:     1. Generalized edema  As noted this is likely due to her stasis changes due to her varicose veins. Compression stockings is basically the best treatment for this at this time. Talked about also increasing her exercise losing weight and other areas that might help in her edema.  - HEMOGLOBIN A1C WITH EAG; Future  - LIPID PANEL; Future  - METABOLIC PANEL, COMPREHENSIVE; Future  - MICROALBUMIN, UR, RAND W/ MICROALB/CREAT RATIO; Future  - CBC WITH AUTOMATED DIFF; Future    2. Type 2 diabetes mellitus without complication, without long-term current use of insulin ([address]  Patient has noted diabetes has been well stable we will check her labs today make sure this is stable so that we can continue medications and follow-up. - HEMOGLOBIN A1C WITH EAG; Future  - LIPID PANEL; Future  - METABOLIC PANEL, COMPREHENSIVE; Future  - MICROALBUMIN, UR, RAND W/ MICROALB/CREAT RATIO; Future  - CBC WITH AUTOMATED DIFF; Future          712    Additional exam findings: We discussed the expected course, resolution and complications of the diagnosis(es) in detail. Medication risks, benefits, costs, interactions, and alternatives were discussed as indicated.   I advised her to contact the office if her condition worsens, changes or fails to improve as anticipated. She expressed understanding with the diagnosis(es) and plan.

## 2021-06-22 NOTE — PROGRESS NOTES
Med Dasilva presents today for   Chief Complaint   Patient presents with    Follow-up     ankle swelling, she said she talked to you on the phone , and she took the lasix and had to potty alot the first day but then it stopped,       Is someone accompanying this pt? no    Is the patient using any DME equipment during OV? no    Depression Screening:  3 most recent PHQ Screens 6/2/2021   Little interest or pleasure in doing things Not at all   Feeling down, depressed, irritable, or hopeless Not at all   Total Score PHQ 2 0       Learning Assessment:  Learning Assessment 2/23/2021   PRIMARY LEARNER Patient   PRIMARY LANGUAGE ENGLISH   LEARNER PREFERENCE PRIMARY DEMONSTRATION   ANSWERED BY Patient   RELATIONSHIP SELF       Fall Risk  Fall Risk Assessment, last 12 mths 6/2/2021   Able to walk? Yes   Fall in past 12 months? 0   Do you feel unsteady? 0   Are you worried about falling 0       ADL  No flowsheet data found. Travel Screening:    Travel Screening     Question   Response    In the last month, have you been in contact with someone who was confirmed or suspected to have Coronavirus / COVID-19? No / Unsure    Have you had a COVID-19 viral test in the last 14 days? No    Do you have any of the following new or worsening symptoms? None of these    Have you traveled internationally or domestically in the last month? No      Travel History   Travel since 05/22/21     No documented travel since 05/22/21          Health Maintenance reviewed and discussed and ordered per Provider.     Health Maintenance Due   Topic Date Due    Hepatitis C Screening  Never done    Foot Exam Q1  Never done    Eye Exam Retinal or Dilated  Never done    COVID-19 Vaccine (1) Never done    DTaP/Tdap/Td series (1 - Tdap) Never done    Shingrix Vaccine Age 50> (1 of 2) Never done    Colorectal Cancer Screening Combo  Never done    Breast Cancer Screen Mammogram  Never done    Bone Densitometry (Dexa) Screening  Never done    Pneumococcal 65+ years (1 of 1 - PPSV23) Never done   . Coordination of Care:  1. Have you been to the ER, urgent care clinic since your last visit? Hospitalized since your last visit? no    2. Have you seen or consulted any other health care providers outside of the [address] [place] [address] since your last visit? Include any pap smears or colon screening.  no

## 2021-06-24 ENCOUNTER — HOSPITAL ENCOUNTER (OUTPATIENT)
Dept: LAB | Age: 71
Discharge: HOME | End: 2021-06-24
Payer: MEDICARE

## 2021-06-24 LAB
ALBUMIN SERPL-MCNC: 3.6 G/DL (ref 3.5–4.7)
ALBUMIN/GLOB SERPL: 1.1 {RATIO}
ALP SERPL-CCNC: 66 U/L (ref 38–126)
ALT SERPL-CCNC: 62 U/L (ref 3–52)
ANION GAP SERPL CALC-SCNC: 10 MMOL/L
AST SERPL W P-5'-P-CCNC: 50 U/L (ref 14–74)
BASOPHILS # BLD: 0 K/UL (ref 0–0.1)
BASOPHILS NFR BLD: 1 % (ref 0–2)
BILIRUB SERPL-MCNC: 0.5 MG/DL (ref 0.2–1)
BUN SERPL-MCNC: 18 MG/DL (ref 9–21)
BUN/CREAT SERPL: 23
CA-I BLD-MCNC: 9.1 MG/DL (ref 8.5–10.5)
CHLORIDE SERPL-SCNC: 104 MMOL/L (ref 94–111)
CO2 SERPL-SCNC: 25 MMOL/L (ref 21–33)
CREAT SERPL-MCNC: 0.8 MG/DL (ref 0.7–1.2)
EOSINOPHIL # BLD: 0.1 K/UL (ref 0–0.4)
EOSINOPHIL NFR BLD: 2 % (ref 0–5)
ERYTHROCYTE [DISTWIDTH] IN BLOOD BY AUTOMATED COUNT: 14.2 % (ref 11.6–14.5)
GLOBULIN SER CALC-MCNC: 3.4 G/DL
GLUCOSE SERPL-MCNC: 116 MG/DL (ref 70–110)
HCT VFR BLD AUTO: 37.7 % (ref 35–45)
HGB BLD-MCNC: 12 G/DL (ref 12–16)
IMM GRANULOCYTES # BLD AUTO: 0 K/UL
IMM GRANULOCYTES NFR BLD AUTO: 0 %
LYMPHOCYTES # BLD: 1.5 K/UL (ref 0.9–3.6)
LYMPHOCYTES NFR BLD: 27 % (ref 21–52)
MCH RBC QN AUTO: 33.4 PG (ref 24–34)
MCHC RBC AUTO-ENTMCNC: 31.8 G/DL (ref 31–37)
MCV RBC AUTO: 105 FL (ref 74–97)
MONOCYTES # BLD: 0.7 K/UL (ref 0.05–1.2)
MONOCYTES NFR BLD: 12 % (ref 3–10)
NEUTS SEG # BLD: 3.1 K/UL (ref 1.8–8)
NEUTS SEG NFR BLD: 58 % (ref 40–73)
PLATELET # BLD AUTO: 268 K/UL (ref 135–420)
PMV BLD AUTO: 11.6 FL (ref 9.2–11.8)
POTASSIUM SERPL-SCNC: 4.5 MMOL/L (ref 3.2–5.1)
PROT SERPL-MCNC: 7 G/DL (ref 6.1–8.4)
RBC # BLD AUTO: 3.59 M/UL (ref 4.2–5.3)
SODIUM SERPL-SCNC: 139 MMOL/L (ref 135–145)
WBC # BLD AUTO: 5.4 K/UL (ref 4.6–13.2)

## 2021-06-24 PROCEDURE — 80061 LIPID PANEL: CPT

## 2021-06-24 PROCEDURE — 82043 UR ALBUMIN QUANTITATIVE: CPT

## 2021-06-24 PROCEDURE — 36415 COLL VENOUS BLD VENIPUNCTURE: CPT

## 2021-06-24 PROCEDURE — 83036 HEMOGLOBIN GLYCOSYLATED A1C: CPT

## 2021-06-24 PROCEDURE — 85025 COMPLETE CBC W/AUTO DIFF WBC: CPT

## 2021-06-24 PROCEDURE — 80053 COMPREHEN METABOLIC PANEL: CPT

## 2021-06-25 LAB
CHOLEST SERPL-MCNC: 147 MG/DL
CREAT UR-MCNC: 65.8 MG/DL
EST. AVERAGE GLUCOSE BLD GHB EST-MCNC: 140 MG/DL
HBA1C MFR BLD: 6.5 % (ref 4–5.6)
HDLC SERPL-MCNC: 56 MG/DL
HDLC SERPL: 2.6 {RATIO} (ref 0–5)
LDLC SERPL CALC-MCNC: 68.6 MG/DL (ref 0–100)
LIPID PROFILE,FLP: NORMAL
MICROALBUMIN UR-MCNC: 2.62 MG/DL
MICROALBUMIN/CREAT UR-RTO: 40 MGMALB/GCRE (ref 0–30)
TRIGL SERPL-MCNC: 112 MG/DL (ref ?–150)
VLDLC SERPL CALC-MCNC: 22.4 MG/DL

## 2021-08-16 ENCOUNTER — TELEPHONE (OUTPATIENT)
Dept: FAMILY MEDICINE CLINIC | Age: 71
End: 2021-08-16

## 2021-08-16 NOTE — TELEPHONE ENCOUNTER
Patient called stating she needed a letter that said she was a diabetic so that she could get a \"booster shot\". She was a patient of Dr. Syed Parada and I let her know that he was no longer here and would need to establish care with another PCP prior to getting the letter. She refused the appointment and began to get rude with me, and asked to speak with someone higher up. I sent call to [facility] and she then spoke with her.

## 2021-08-16 NOTE — TELEPHONE ENCOUNTER
Upon speaking with patient I asked who told her she needed a booster shot she said she saw it on the news and needed a note stating she is diabetic.  I advised her that she needed to speak with her doctor to determine if and when she needs this booster shot I even told her that things she sees on the news is not specific to anyone persons health condition she did not like that answer and hung up on me

## 2021-09-08 ENCOUNTER — TRANSCRIBE ORDER (OUTPATIENT)
Dept: SCHEDULING | Age: 71
End: 2021-09-08

## 2021-09-08 DIAGNOSIS — I42.0 CONGESTIVE CARDIOMYOPATHY (HCC): Primary | ICD-10-CM

## 2021-09-23 ENCOUNTER — HOSPITAL ENCOUNTER (OUTPATIENT)
Dept: NON INVASIVE DIAGNOSTICS | Age: 71
Discharge: HOME | End: 2021-09-23
Attending: INTERNAL MEDICINE
Payer: MEDICARE

## 2021-09-23 VITALS
HEIGHT: 62 IN | BODY MASS INDEX: 37.73 KG/M2 | SYSTOLIC BLOOD PRESSURE: 143 MMHG | WEIGHT: 205 LBS | DIASTOLIC BLOOD PRESSURE: 73 MMHG

## 2021-09-23 DIAGNOSIS — I42.0 CONGESTIVE CARDIOMYOPATHY (HCC): ICD-10-CM

## 2021-09-23 LAB
ECHO AO ROOT DIAM: 3 CM
ECHO AV AREA PEAK VELOCITY: 3.08 CM2
ECHO AV AREA PLAN/BSA: 1.44
ECHO AV AREA VTI: 2.78 CM2
ECHO AV AREA/BSA PEAK VELOCITY: 1.6 CM2/M2
ECHO AV AREA/BSA VTI: 1.4 CM2/M2
ECHO AV CUSP MM: 1.8 CM
ECHO AV MEAN GRADIENT: 3 MMHG
ECHO AV MEAN VELOCITY: 82.6 CM/S
ECHO AV PEAK GRADIENT: 5 MMHG
ECHO AV PEAK VELOCITY: 113 CM/S
ECHO AV VTI: 24.1 CM
ECHO EST RA PRESSURE: 3 MMHG
ECHO LA AREA 2C: 17.3 CM2
ECHO LA AREA 4C: 18.7 CM2
ECHO LA MAJOR AXIS: 3.7 CM
ECHO LA MAJOR AXIS: 5.87 CM
ECHO LA TO AORTIC ROOT RATIO: 1.23
ECHO LV E' LATERAL VELOCITY: 9.14 CM/S
ECHO LV E' SEPTAL VELOCITY: 8.16 CM/S
ECHO LV EDV A2C: 68.9 CM3
ECHO LV EJECTION FRACTION BIPLANE: 66.8 % (ref 55–100)
ECHO LV ESV A2C: 17.6 CM3
ECHO LV INTERNAL DIMENSION DIASTOLIC: 4.1 CM (ref 3.9–5.3)
ECHO LV INTERNAL DIMENSION SYSTOLIC: 2.6 CM
ECHO LV IVSD: 0.7 CM (ref 0.6–0.9)
ECHO LV MASS 2D: 97.3 G (ref 67–162)
ECHO LV MASS INDEX 2D: 50.4 G/M2 (ref 43–95)
ECHO LV POSTERIOR WALL DIASTOLIC: 0.9 CM (ref 0.6–0.9)
ECHO LVOT DIAM: 2 CM
ECHO LVOT PEAK GRADIENT: 5 MMHG
ECHO LVOT PEAK VELOCITY: 111 CM/S
ECHO LVOT SV: 67 CM3
ECHO LVOT VTI: 21.3 CM
ECHO LVOT VTI: 21.3 CM
ECHO MV A VELOCITY: 60.1 CM/S
ECHO MV AREA PHT: 4.58 CM2
ECHO MV E DECELERATION TIME (DT): 165 MS
ECHO MV E VELOCITY: 67.4 CM/S
ECHO MV E/A RATIO: 1.12
ECHO MV E/E' LATERAL: 7.37
ECHO MV E/E' RATIO (AVERAGED): 7.82
ECHO MV E/E' SEPTAL: 8.26
ECHO MV MAX VELOCITY: 90.4 CM/S
ECHO MV MEAN GRADIENT: 1 MMHG
ECHO MV PEAK GRADIENT: 3 MMHG
ECHO MV PRESSURE HALF TIME (PHT): 48 MS
ECHO MV VTI: 20.6 CM
ECHO RA AREA 4C: 10.2 CM2
ECHO RA MAJOR AXIS: 4.43 CM
ECHO RA MINOR AXIS: 3.9 CM
ECHO RIGHT VENTRICULAR SYSTOLIC PRESSURE (RVSP): 27 MMHG
ECHO RV INTERNAL DIMENSION: 3 CM
ECHO RV TAPSE: 1.63 CM (ref 1.5–2)
ECHO TV MEAN GRADIENT: 24 MMHG
ECHO TV REGURGITANT MAX VELOCITY: 244 CM/S
LA VOL DISK BP: 48.2 CM3 (ref 22–52)
LVOT MG: 3 MMHG
MV DEC SLOPE: 4070 MM/S2
MV DEC SLOPE: 4070 MM/S2

## 2021-09-23 PROCEDURE — 93306 TTE W/DOPPLER COMPLETE: CPT

## 2021-10-28 ENCOUNTER — HOSPITAL ENCOUNTER (OUTPATIENT)
Dept: NUTRITION | Age: 71
Discharge: HOME | End: 2021-10-28
Payer: MEDICARE

## 2021-10-28 ENCOUNTER — TRANSCRIBE ORDER (OUTPATIENT)
Dept: REGISTRATION | Age: 71
End: 2021-10-28

## 2021-10-28 VITALS — HEIGHT: 62 IN | BODY MASS INDEX: 37.49 KG/M2

## 2021-10-28 PROCEDURE — 74750000193 HC WEIGHT LOSS 5 SESSIONS- SELF PAY

## 2021-10-28 NOTE — PROGRESS NOTES
Comprehensive Nutrition Assessment    Type and Reason for Visit: Patient education    Nutrition Recommendations/Plan:  5 week weight loss counseling sessions to review composition of foods and meal planning to produce negative energy balance and reduced fat intake while maintaining carbohydrate consistency. Nutrition Assessment:  Pt seen for  referral of  weight loss due to nonalcholic steatohepatitis. Pt has a history of DM2, chronic diarrhea, GERD, HTN, RA, COPD, Sleep apnea. Pt eager for appointment and had many questions on whether or not she could eat certain foods. Food history reveals high saturated fat intake including mayonnaise, butter, cheese, cured meats, fried foods. She states her glucose has elevated 10-15 points since starting to watch her foods. She states she avoids potatoes, popcorn, rice, pasta but would like to eat these foods. Diet recall reveals 3 meals per day with a night snack (preferably buttered popcorn). Exercise includes at least 3 classes a week followed by water walking. She requested help with reducing/eliminating medications that affect liver function, and was referred back to her provider for assistance with medications Meds include Mg, multivitamin, K, vit C, simvastatin, propranool, oxybutynin chloride, methotrexate, Olumiant, omeprazole, melatonin,metamucil, flonase. Pt states rheumatologist stated she would need to take Fe, Mg, Vit C for her lifetime due to RA reducing these vitamins, minerals. Pt will consume 4-5 servings of fruit daily, but she dislikes vegetables unless they are raw and have dressing. Pt prefers butter because it is a \"natural food\" vs margarine which has been maniputated. Estimated Daily Nutrient Needs:  Energy (kcal): 3302-9363 Kcal/d (15-20 Kcal/kg); Weight Used for Energy Requirements: Current  Protein (g): 74 gm/d (0.8 gm/kg); Weight Used for Protein Requirements: Current  Fluid (ml/day): 7465-1770 ml/d (20-25 ml/kg);  Method Used for Fluid Requirements: ml/kg      Nutrition Related Findings:  Glucose elevated from  per accuchecks to  after decreasing her fat intake per patient, may be due to elevated glucose intake in low free salad dressings. Pt currently in energy balance x 1 year. Wounds:    None       Current Nutrition Therapies:  Heart healthy meal planning to avoid further HACKETT symptoms. Anthropometric Measures:  · Height:  5' 2\" (157.5 cm)  · Current Body Wt:  92.1 kg (203 lb)   · Admission Body Wt:       · Usual Body Wt:  93 kg (205 lb)     · Ideal Body Wt:  110 lbs:  184.5 %   · Adjusted Body Weight:   ; Weight Adjustment for:     · Adjusted BMI:       · BMI Category:  Obese class 2 (BMI 35.0-39. 9)       Nutrition Diagnosis:   · Overweight/obesity related to food and nutrition related knowledge deficit, excessive energy intake as evidenced by BMI, other (specify) (diet hx reveals high intake of saturated fat daily.)      Nutrition Interventions:   Food and/or Nutrient Delivery: Modify current diet (Work towards reducing saturated fat and calorie intake.)  Nutrition Education and Counseling: Counseling initiated (discussion around fat composition of foods and changes pt willing to make in meal planning.)  Coordination of Nutrition Care:      Goals:  Pt will identify and reduce intake of foods high in saturated fat resulting in negative energy balance and weight loss of 1-2 pounds per week. Carbohydrate intake will be maintained with glucose . Nutrition Monitoring and Evaluation:   Behavioral-Environmental Outcomes: Beliefs and attitudes, Knowledge or skill (Education given on food composition and safety of intake.   Discussion inititated on making changes to portion and how often foods are consumed vs elimination.)  Food/Nutrient Intake Outcomes:    Physical Signs/Symptoms Outcomes: Biochemical data, Weight    Discharge Planning:          Electronically signed by Louis James RD on 10/28/2021 at 6:51 PM    Contact: [phone] or adam

## 2021-11-27 ENCOUNTER — DOCUMENTATION ONLY (OUTPATIENT)
Dept: NUTRITION | Age: 71
End: 2021-11-27

## 2021-11-27 NOTE — PROGRESS NOTES
Type and Reason for Visit: Patient education     Nutrition Recommendations/Plan:   11/27/2021- recommend 20 week weight check program for continued weight loss monitoring. 5 week weight loss counseling sessions to review composition of foods and meal planning to produce negative energy balance and reduced fat intake while maintaining carbohydrate consistency. (completed 11/27/2021)     Nutrition Assessment:  Pt seen for  referral of  weight loss due to nonalcholic steatohepatitis. Pt has a history of DM2, chronic diarrhea, GERD, HTN, RA, COPD, Sleep apnea. Pt eager for appointment and had many questions on whether or not she could eat certain foods. Food history reveals high saturated fat intake including mayonnaise, butter, cheese, cured meats, fried foods. She states her glucose has elevated 10-15 points since starting to watch her foods. She states she avoids potatoes, popcorn, rice, pasta but would like to eat these foods. Diet recall reveals 3 meals per day with a night snack (preferably buttered popcorn). Exercise includes at least 3 classes a week followed by water walking. She requested help with reducing/eliminating medications that affect liver function, and was referred back to her provider for assistance with medications Meds include Mg, multivitamin, K, vit C, simvastatin, propranool, oxybutynin chloride, methotrexate, Olumiant, omeprazole, melatonin,metamucil, flonase. Pt states rheumatologist stated she would need to take Fe, Mg, Vit C for her lifetime due to RA reducing these vitamins, minerals. Pt will consume 4-5 servings of fruit daily, but she dislikes vegetables unless they are raw and have dressing. Pt prefers butter because it is a \"natural food\" vs margarine which has been maniputated. 11/27/2021 - improvement in meal intake of saturated fat AEB food diary results, 4 pound weight loss and diet recall of healthier eating goals.   Pt accepted and is consuming unsaturated fat choices and avoiding or minimally consuming mayonnaise butter, cheese, cured meats and fried foods. Glucose improved with reduction in fruit intake and increase in activity to 4 days per week.         Estimated Daily Nutrient Needs:  Energy (kcal): 9830-0366 Kcal/d (15-20 Kcal/kg); Weight Used for Energy Requirements: Current  Protein (g): 74 gm/d (0.8 gm/kg); Weight Used for Protein Requirements: Current  Fluid (ml/day): 1381-0685 ml/d (20-25 ml/kg); Method Used for Fluid Requirements: ml/kg        Nutrition Related Findings:  Glucose elevated from  per accuchecks to  after decreasing her fat intake per patient, may be due to elevated glucose intake in low free salad dressings. Pt currently in energy balance x 1 year.       Wounds:    None        Current Nutrition Therapies:  Heart healthy meal planning to avoid further HACKETT symptoms.     Anthropometric Measures:  · Height:  5' 2\" (157.5 cm)  · Current Body Wt:  92.1 kg (203 lb)   · Admission Body Wt:       · Usual Body Wt:  93 kg (205 lb)     · Ideal Body Wt:  110 lbs:  184.5 %   · Adjusted Body Weight:   ; Weight Adjustment for:     · Adjusted BMI:       · BMI Category:  Obese class 2 (BMI 35.0-39. 9)        Nutrition Diagnosis:   · Overweight/obesity related to food and nutrition related knowledge deficit, excessive energy intake as evidenced by BMI, other (specify) (diet hx reveals high intake of saturated fat daily.)     Nutrition Interventions:   Food and/or Nutrient Delivery: Modify current diet (Work towards reducing saturated fat and calorie intake.)  Nutrition Education and Counseling: Counseling initiated (discussion around fat composition of foods and changes pt willing to make in meal planning.)  Coordination of Nutrition Care:       Goals:  Pt will identify and reduce intake of foods high in saturated fat resulting in negative energy balance and weight loss of 1-2 pounds per week. Carbohydrate intake will be maintained with glucose . 11/27/2021 - BMI now 36.5 with weight 199 pounds/90.4 kg.      Nutrition Monitoring and Evaluation:   Behavioral-Environmental Outcomes: Beliefs and attitudes, Knowledge or skill (Education given on food composition and safety of intake. Discussion inititated on making changes to portion and how often foods are consumed vs elimination.)  Food/Nutrient Intake Outcomes:   Improved selection and consumption of plant based foods and lower saturated fat meats to promote weight loss. Physical Signs/Symptoms Outcomes: Biochemical data, Weight     Discharge Planning:  Recommend continue current improved eating habits with activity 5 times per week. Weight check program available to help with accountability.

## 2022-10-17 ENCOUNTER — TRANSCRIBE ORDER (OUTPATIENT)
Dept: SCHEDULING | Age: 72
End: 2022-10-17

## 2022-10-17 DIAGNOSIS — I34.1 MVP (MITRAL VALVE PROLAPSE): Primary | ICD-10-CM

## 2022-10-20 ENCOUNTER — HOSPITAL ENCOUNTER (OUTPATIENT)
Dept: NON INVASIVE DIAGNOSTICS | Age: 72
Discharge: HOME | End: 2022-10-20
Attending: INTERNAL MEDICINE
Payer: MEDICARE

## 2022-10-20 VITALS
HEIGHT: 62 IN | DIASTOLIC BLOOD PRESSURE: 67 MMHG | WEIGHT: 194 LBS | SYSTOLIC BLOOD PRESSURE: 168 MMHG | BODY MASS INDEX: 35.7 KG/M2

## 2022-10-20 DIAGNOSIS — I34.1 MVP (MITRAL VALVE PROLAPSE): ICD-10-CM

## 2022-10-20 LAB
ECHO AO ROOT DIAM: 3.1 CM
ECHO AO ROOT INDEX: 1.64 CM/M2
ECHO AV AREA PEAK VELOCITY: 2.1 CM2
ECHO AV AREA VTI: 2.2 CM2
ECHO AV AREA/BSA PEAK VELOCITY: 1.1 CM2/M2
ECHO AV AREA/BSA VTI: 1.2 CM2/M2
ECHO AV MEAN GRADIENT: 4 MMHG
ECHO AV MEAN VELOCITY: 1 M/S
ECHO AV PEAK GRADIENT: 8 MMHG
ECHO AV PEAK VELOCITY: 1.4 M/S
ECHO AV VELOCITY RATIO: 0.71
ECHO AV VTI: 32 CM
ECHO EST RA PRESSURE: 3 MMHG
ECHO LA DIAMETER INDEX: 1.9 CM/M2
ECHO LA DIAMETER: 3.6 CM
ECHO LA TO AORTIC ROOT RATIO: 1.16
ECHO LA VOL 2C: 76 ML (ref 22–52)
ECHO LA VOL 4C: 48 ML (ref 22–52)
ECHO LA VOL BP: 65 ML (ref 22–52)
ECHO LA VOL/BSA BIPLANE: 34 ML/M2 (ref 16–34)
ECHO LA VOLUME AREA LENGTH: 70 ML
ECHO LA VOLUME INDEX A2C: 40 ML/M2 (ref 16–34)
ECHO LA VOLUME INDEX A4C: 25 ML/M2 (ref 16–34)
ECHO LA VOLUME INDEX AREA LENGTH: 37 ML/M2 (ref 16–34)
ECHO LV E' LATERAL VELOCITY: 10 CM/S
ECHO LV E' SEPTAL VELOCITY: 12 CM/S
ECHO LV EDV A2C: 76 ML
ECHO LV EDV A4C: 46 ML
ECHO LV EDV BP: 62 ML (ref 56–104)
ECHO LV EDV INDEX A4C: 24 ML/M2
ECHO LV EDV INDEX BP: 33 ML/M2
ECHO LV EDV NDEX A2C: 40 ML/M2
ECHO LV EJECTION FRACTION A2C: 55 %
ECHO LV EJECTION FRACTION A4C: 63 %
ECHO LV EJECTION FRACTION BIPLANE: 60 % (ref 55–100)
ECHO LV ESV A2C: 34 ML
ECHO LV ESV A4C: 17 ML
ECHO LV ESV BP: 25 ML (ref 19–49)
ECHO LV ESV INDEX A2C: 18 ML/M2
ECHO LV ESV INDEX A4C: 9 ML/M2
ECHO LV ESV INDEX BP: 13 ML/M2
ECHO LV FRACTIONAL SHORTENING: 39 % (ref 28–44)
ECHO LV INTERNAL DIMENSION DIASTOLE INDEX: 2.33 CM/M2
ECHO LV INTERNAL DIMENSION DIASTOLIC: 4.4 CM (ref 3.9–5.3)
ECHO LV INTERNAL DIMENSION SYSTOLIC INDEX: 1.43 CM/M2
ECHO LV INTERNAL DIMENSION SYSTOLIC: 2.7 CM
ECHO LV IVSD: 1 CM (ref 0.6–0.9)
ECHO LV MASS 2D: 158.2 G (ref 67–162)
ECHO LV MASS INDEX 2D: 83.7 G/M2 (ref 43–95)
ECHO LV POSTERIOR WALL DIASTOLIC: 1.1 CM (ref 0.6–0.9)
ECHO LV RELATIVE WALL THICKNESS RATIO: 0.5
ECHO LVOT AREA: 3.1 CM2
ECHO LVOT AV VTI INDEX: 0.72
ECHO LVOT DIAM: 2 CM
ECHO LVOT MEAN GRADIENT: 2 MMHG
ECHO LVOT PEAK GRADIENT: 4 MMHG
ECHO LVOT PEAK VELOCITY: 1 M/S
ECHO LVOT STROKE VOLUME INDEX: 38.4 ML/M2
ECHO LVOT SV: 72.5 ML
ECHO LVOT VTI: 23.1 CM
ECHO MV A VELOCITY: 0.73 M/S
ECHO MV AREA VTI: 4.9 CM2
ECHO MV E DECELERATION TIME (DT): 160.7 MS
ECHO MV E VELOCITY: 0.98 M/S
ECHO MV E/A RATIO: 1.34
ECHO MV E/E' LATERAL: 9.8
ECHO MV E/E' RATIO (AVERAGED): 8.98
ECHO MV E/E' SEPTAL: 8.17
ECHO MV LVOT VTI INDEX: 0.64
ECHO MV MAX VELOCITY: 0.7 M/S
ECHO MV MEAN GRADIENT: 1 MMHG
ECHO MV MEAN VELOCITY: 0.3 M/S
ECHO MV PEAK GRADIENT: 2 MMHG
ECHO MV VTI: 14.8 CM
ECHO PV MAX VELOCITY: 1 M/S
ECHO PV MEAN GRADIENT: 2 MMHG
ECHO PV MEAN VELOCITY: 0.7 M/S
ECHO PV PEAK GRADIENT: 4 MMHG
ECHO RIGHT VENTRICULAR SYSTOLIC PRESSURE (RVSP): 24 MMHG
ECHO RV TAPSE: 2 CM (ref 1.7–?)
ECHO TV REGURGITANT MAX VELOCITY: 2.29 M/S
ECHO TV REGURGITANT PEAK GRADIENT: 21 MMHG

## 2022-10-20 PROCEDURE — 93306 TTE W/DOPPLER COMPLETE: CPT

## 2022-12-15 ENCOUNTER — HOSPITAL ENCOUNTER (INPATIENT)
Age: 72
LOS: 2 days | Discharge: HOME | End: 2022-12-17
Attending: INTERNAL MEDICINE | Admitting: INTERNAL MEDICINE
Payer: MEDICARE

## 2022-12-15 ENCOUNTER — APPOINTMENT (OUTPATIENT)
Dept: GENERAL RADIOLOGY | Age: 72
End: 2022-12-15
Attending: INTERNAL MEDICINE
Payer: MEDICARE

## 2022-12-15 DIAGNOSIS — J10.1 INFLUENZA B: ICD-10-CM

## 2022-12-15 DIAGNOSIS — J18.9 PNEUMONIA OF LEFT LOWER LOBE DUE TO INFECTIOUS ORGANISM: Primary | ICD-10-CM

## 2022-12-15 DIAGNOSIS — A41.9 SEPSIS, DUE TO UNSPECIFIED ORGANISM, UNSPECIFIED WHETHER ACUTE ORGAN DYSFUNCTION PRESENT (HCC): ICD-10-CM

## 2022-12-15 LAB
ALBUMIN SERPL-MCNC: 3.1 G/DL (ref 3.4–5)
ALBUMIN/GLOB SERPL: 0.7 {RATIO} (ref 0.8–1.7)
ALP SERPL-CCNC: 75 U/L (ref 45–117)
ALT SERPL-CCNC: 44 U/L (ref 13–56)
ANION GAP SERPL CALC-SCNC: 10 MMOL/L (ref 3–18)
APPEARANCE UR: ABNORMAL
AST SERPL W P-5'-P-CCNC: 32 U/L (ref 10–38)
ATRIAL RATE: 103 BPM
BACTERIA URNS QL MICRO: ABNORMAL /HPF
BASOPHILS # BLD: 0 K/UL (ref 0–0.1)
BASOPHILS NFR BLD: 0 % (ref 0–2)
BILIRUB SERPL-MCNC: 0.5 MG/DL (ref 0.2–1)
BILIRUB UR QL: NEGATIVE
BNP SERPL-MCNC: 220 PG/ML (ref 0–900)
BUN SERPL-MCNC: 23 MG/DL (ref 7–18)
BUN/CREAT SERPL: 25 (ref 12–20)
CA-I BLD-MCNC: 9.1 MG/DL (ref 8.5–10.1)
CALCULATED P AXIS, ECG09: 48 DEGREES
CALCULATED R AXIS, ECG10: -15 DEGREES
CALCULATED T AXIS, ECG11: 33 DEGREES
CHLORIDE SERPL-SCNC: 103 MMOL/L (ref 100–111)
CO2 SERPL-SCNC: 24 MMOL/L (ref 21–32)
COLOR UR: YELLOW
COVID-19 RAPID TEST, COVR: NOT DETECTED
CREAT SERPL-MCNC: 0.93 MG/DL (ref 0.6–1.3)
DIAGNOSIS, 93000: NORMAL
DIFFERENTIAL METHOD BLD: ABNORMAL
EOSINOPHIL # BLD: 0 K/UL (ref 0–0.4)
EOSINOPHIL NFR BLD: 0 % (ref 0–5)
EPITH CASTS URNS QL MICRO: ABNORMAL /LPF (ref 0–20)
ERYTHROCYTE [DISTWIDTH] IN BLOOD BY AUTOMATED COUNT: 15.5 % (ref 11.6–14.5)
ERYTHROCYTE [DISTWIDTH] IN BLOOD BY AUTOMATED COUNT: 15.7 % (ref 11.6–14.5)
FLUAV AG NPH QL IA: NEGATIVE
FLUBV AG NOSE QL IA: POSITIVE
GLOBULIN SER CALC-MCNC: 4.4 G/DL (ref 2–4)
GLUCOSE BLD STRIP.AUTO-MCNC: 104 MG/DL (ref 70–110)
GLUCOSE BLD STRIP.AUTO-MCNC: 124 MG/DL (ref 70–110)
GLUCOSE BLD STRIP.AUTO-MCNC: 180 MG/DL (ref 70–110)
GLUCOSE SERPL-MCNC: 183 MG/DL (ref 74–99)
GLUCOSE UR STRIP.AUTO-MCNC: NEGATIVE MG/DL
HCT VFR BLD AUTO: 31.3 % (ref 35–45)
HCT VFR BLD AUTO: 34.9 % (ref 35–45)
HGB BLD-MCNC: 10 G/DL (ref 12–16)
HGB BLD-MCNC: 11.2 G/DL (ref 12–16)
HGB UR QL STRIP: ABNORMAL
IMM GRANULOCYTES # BLD AUTO: 0 K/UL (ref 0–0.04)
IMM GRANULOCYTES NFR BLD AUTO: 0 % (ref 0–0.5)
KETONES UR QL STRIP.AUTO: NEGATIVE MG/DL
LACTATE SERPL-SCNC: 2.4 MMOL/L (ref 0.4–2)
LACTATE SERPL-SCNC: 3.1 MMOL/L (ref 0.4–2)
LACTATE SERPL-SCNC: 3.3 MMOL/L (ref 0.4–2)
LEUKOCYTE ESTERASE UR QL STRIP.AUTO: NEGATIVE
LIPASE SERPL-CCNC: 185 U/L (ref 73–393)
LYMPHOCYTES # BLD: 0.6 K/UL (ref 0.9–3.6)
LYMPHOCYTES NFR BLD: 5 % (ref 21–52)
MCH RBC QN AUTO: 32 PG (ref 24–34)
MCH RBC QN AUTO: 32.7 PG (ref 24–34)
MCHC RBC AUTO-ENTMCNC: 31.9 G/DL (ref 31–37)
MCHC RBC AUTO-ENTMCNC: 32.1 G/DL (ref 31–37)
MCV RBC AUTO: 102.3 FL (ref 78–100)
MCV RBC AUTO: 99.7 FL (ref 78–100)
MONOCYTES # BLD: 0.3 K/UL (ref 0.05–1.2)
MONOCYTES NFR BLD: 3 % (ref 3–10)
NEUTS SEG # BLD: 11.3 K/UL (ref 1.8–8)
NEUTS SEG NFR BLD: 92 % (ref 40–73)
NITRITE UR QL STRIP.AUTO: POSITIVE
NRBC # BLD: 0 K/UL (ref 0–0.01)
NRBC # BLD: 0 K/UL (ref 0–0.01)
NRBC BLD-RTO: 0 PER 100 WBC
NRBC BLD-RTO: 0 PER 100 WBC
P-R INTERVAL, ECG05: 136 MS
PERFORMED BY, TECHID: ABNORMAL
PERFORMED BY, TECHID: ABNORMAL
PERFORMED BY, TECHID: NORMAL
PH UR STRIP: 6 [PH] (ref 5–8)
PLATELET # BLD AUTO: 334 K/UL (ref 135–420)
PLATELET # BLD AUTO: 417 K/UL (ref 135–420)
PMV BLD AUTO: 10.1 FL (ref 9.2–11.8)
PMV BLD AUTO: 10.3 FL (ref 9.2–11.8)
POTASSIUM SERPL-SCNC: 4.5 MMOL/L (ref 3.5–5.5)
PROCALCITONIN SERPL-MCNC: 0.2 NG/ML
PROT SERPL-MCNC: 7.5 G/DL (ref 6.4–8.2)
PROT UR STRIP-MCNC: 30 MG/DL
Q-T INTERVAL, ECG07: 314 MS
QRS DURATION, ECG06: 89 MS
QTC CALCULATION (BEZET), ECG08: 409 MS
RBC # BLD AUTO: 3.06 M/UL (ref 4.2–5.3)
RBC # BLD AUTO: 3.5 M/UL (ref 4.2–5.3)
RBC #/AREA URNS HPF: ABNORMAL /HPF (ref 0–2)
RSV AG NPH QL IA: NEGATIVE
SODIUM SERPL-SCNC: 137 MMOL/L (ref 136–145)
SP GR UR REFRACTOMETRY: 1.02 (ref 1–1.03)
TROPONIN-HIGH SENSITIVITY: 11 NG/L (ref 0–54)
UROBILINOGEN UR QL STRIP.AUTO: 0.2 EU/DL (ref 0.2–1)
VENTRICULAR RATE, ECG03: 102 BPM
WBC # BLD AUTO: 12.3 K/UL (ref 4.6–13.2)
WBC # BLD AUTO: 17.2 K/UL (ref 4.6–13.2)
WBC URNS QL MICRO: ABNORMAL /HPF (ref 0–4)
YEAST URNS QL MICRO: PRESENT

## 2022-12-15 PROCEDURE — 74011250637 HC RX REV CODE- 250/637: Performed by: NURSE PRACTITIONER

## 2022-12-15 PROCEDURE — 87635 SARS-COV-2 COVID-19 AMP PRB: CPT

## 2022-12-15 PROCEDURE — 96366 THER/PROPH/DIAG IV INF ADDON: CPT

## 2022-12-15 PROCEDURE — 80053 COMPREHEN METABOLIC PANEL: CPT

## 2022-12-15 PROCEDURE — 83605 ASSAY OF LACTIC ACID: CPT

## 2022-12-15 PROCEDURE — 74011250636 HC RX REV CODE- 250/636: Performed by: INTERNAL MEDICINE

## 2022-12-15 PROCEDURE — 74011000258 HC RX REV CODE- 258: Performed by: INTERNAL MEDICINE

## 2022-12-15 PROCEDURE — 77010033678 HC OXYGEN DAILY

## 2022-12-15 PROCEDURE — 94761 N-INVAS EAR/PLS OXIMETRY MLT: CPT

## 2022-12-15 PROCEDURE — 96361 HYDRATE IV INFUSION ADD-ON: CPT

## 2022-12-15 PROCEDURE — 96367 TX/PROPH/DG ADDL SEQ IV INF: CPT

## 2022-12-15 PROCEDURE — 74011250636 HC RX REV CODE- 250/636: Performed by: NURSE PRACTITIONER

## 2022-12-15 PROCEDURE — 65270000029 HC RM PRIVATE

## 2022-12-15 PROCEDURE — 74011000250 HC RX REV CODE- 250: Performed by: NURSE PRACTITIONER

## 2022-12-15 PROCEDURE — 93005 ELECTROCARDIOGRAM TRACING: CPT

## 2022-12-15 PROCEDURE — 96365 THER/PROPH/DIAG IV INF INIT: CPT

## 2022-12-15 PROCEDURE — 81001 URINALYSIS AUTO W/SCOPE: CPT

## 2022-12-15 PROCEDURE — 83880 ASSAY OF NATRIURETIC PEPTIDE: CPT

## 2022-12-15 PROCEDURE — 85027 COMPLETE CBC AUTOMATED: CPT

## 2022-12-15 PROCEDURE — 71045 X-RAY EXAM CHEST 1 VIEW: CPT

## 2022-12-15 PROCEDURE — 84484 ASSAY OF TROPONIN QUANT: CPT

## 2022-12-15 PROCEDURE — 99285 EMERGENCY DEPT VISIT HI MDM: CPT

## 2022-12-15 PROCEDURE — 87807 RSV ASSAY W/OPTIC: CPT

## 2022-12-15 PROCEDURE — 87040 BLOOD CULTURE FOR BACTERIA: CPT

## 2022-12-15 PROCEDURE — 87804 INFLUENZA ASSAY W/OPTIC: CPT

## 2022-12-15 PROCEDURE — 83690 ASSAY OF LIPASE: CPT

## 2022-12-15 PROCEDURE — 74011250637 HC RX REV CODE- 250/637: Performed by: INTERNAL MEDICINE

## 2022-12-15 PROCEDURE — 85025 COMPLETE CBC W/AUTO DIFF WBC: CPT

## 2022-12-15 PROCEDURE — 84145 PROCALCITONIN (PCT): CPT

## 2022-12-15 PROCEDURE — 82962 GLUCOSE BLOOD TEST: CPT

## 2022-12-15 RX ORDER — ONDANSETRON 2 MG/ML
4 INJECTION INTRAMUSCULAR; INTRAVENOUS
Status: DISCONTINUED | OUTPATIENT
Start: 2022-12-15 | End: 2022-12-17 | Stop reason: HOSPADM

## 2022-12-15 RX ORDER — OXYBUTYNIN CHLORIDE 5 MG/1
10 TABLET, EXTENDED RELEASE ORAL DAILY
Status: DISCONTINUED | OUTPATIENT
Start: 2022-12-16 | End: 2022-12-15 | Stop reason: CLARIF

## 2022-12-15 RX ORDER — ENOXAPARIN SODIUM 100 MG/ML
40 INJECTION SUBCUTANEOUS DAILY
Status: DISCONTINUED | OUTPATIENT
Start: 2022-12-16 | End: 2022-12-17 | Stop reason: HOSPADM

## 2022-12-15 RX ORDER — TROSPIUM CHLORIDE 20 MG/1
20 TABLET, FILM COATED ORAL 2 TIMES DAILY WITH MEALS
Status: DISCONTINUED | OUTPATIENT
Start: 2022-12-15 | End: 2022-12-17 | Stop reason: HOSPADM

## 2022-12-15 RX ORDER — SODIUM CHLORIDE 0.9 % (FLUSH) 0.9 %
5-10 SYRINGE (ML) INJECTION AS NEEDED
Status: DISCONTINUED | OUTPATIENT
Start: 2022-12-15 | End: 2022-12-17 | Stop reason: HOSPADM

## 2022-12-15 RX ORDER — ASPIRIN 81 MG/1
81 TABLET ORAL DAILY
Status: DISCONTINUED | OUTPATIENT
Start: 2022-12-16 | End: 2022-12-17 | Stop reason: HOSPADM

## 2022-12-15 RX ORDER — PANTOPRAZOLE SODIUM 40 MG/1
40 TABLET, DELAYED RELEASE ORAL
Status: DISCONTINUED | OUTPATIENT
Start: 2022-12-16 | End: 2022-12-17 | Stop reason: HOSPADM

## 2022-12-15 RX ORDER — MAGNESIUM SULFATE 100 %
4 CRYSTALS MISCELLANEOUS AS NEEDED
Status: DISCONTINUED | OUTPATIENT
Start: 2022-12-15 | End: 2022-12-17 | Stop reason: HOSPADM

## 2022-12-15 RX ORDER — METHOTREXATE 2.5 MG/1
15 TABLET ORAL
Status: DISCONTINUED | OUTPATIENT
Start: 2022-12-19 | End: 2022-12-17 | Stop reason: HOSPADM

## 2022-12-15 RX ORDER — SODIUM CHLORIDE 9 MG/ML
75 INJECTION, SOLUTION INTRAVENOUS CONTINUOUS
Status: DISCONTINUED | OUTPATIENT
Start: 2022-12-15 | End: 2022-12-16

## 2022-12-15 RX ORDER — DEXTROSE MONOHYDRATE 100 MG/ML
125-250 INJECTION, SOLUTION INTRAVENOUS AS NEEDED
Status: DISCONTINUED | OUTPATIENT
Start: 2022-12-15 | End: 2022-12-17 | Stop reason: HOSPADM

## 2022-12-15 RX ORDER — INSULIN LISPRO 100 [IU]/ML
INJECTION, SOLUTION INTRAVENOUS; SUBCUTANEOUS
Status: DISCONTINUED | OUTPATIENT
Start: 2022-12-15 | End: 2022-12-17 | Stop reason: HOSPADM

## 2022-12-15 RX ORDER — ONDANSETRON 4 MG/1
4 TABLET, ORALLY DISINTEGRATING ORAL
Status: DISCONTINUED | OUTPATIENT
Start: 2022-12-15 | End: 2022-12-17 | Stop reason: HOSPADM

## 2022-12-15 RX ORDER — LANOLIN ALCOHOL/MO/W.PET/CERES
0.4 CREAM (GRAM) TOPICAL EVERY EVENING
Status: DISCONTINUED | OUTPATIENT
Start: 2022-12-15 | End: 2022-12-17 | Stop reason: HOSPADM

## 2022-12-15 RX ORDER — POLYETHYLENE GLYCOL 3350 17 G/17G
17 POWDER, FOR SOLUTION ORAL DAILY PRN
Status: DISCONTINUED | OUTPATIENT
Start: 2022-12-15 | End: 2022-12-17 | Stop reason: HOSPADM

## 2022-12-15 RX ORDER — METHOTREXATE 2.5 MG/1
15 TABLET ORAL
Status: DISCONTINUED | OUTPATIENT
Start: 2022-12-19 | End: 2022-12-15

## 2022-12-15 RX ORDER — SODIUM CHLORIDE 0.9 % (FLUSH) 0.9 %
5-40 SYRINGE (ML) INJECTION EVERY 8 HOURS
Status: DISCONTINUED | OUTPATIENT
Start: 2022-12-15 | End: 2022-12-17 | Stop reason: HOSPADM

## 2022-12-15 RX ORDER — ACETAMINOPHEN 500 MG
1000 TABLET ORAL ONCE
Status: COMPLETED | OUTPATIENT
Start: 2022-12-15 | End: 2022-12-15

## 2022-12-15 RX ORDER — SODIUM CHLORIDE 0.9 % (FLUSH) 0.9 %
5-40 SYRINGE (ML) INJECTION AS NEEDED
Status: DISCONTINUED | OUTPATIENT
Start: 2022-12-15 | End: 2022-12-17 | Stop reason: HOSPADM

## 2022-12-15 RX ORDER — ACETAMINOPHEN 325 MG/1
650 TABLET ORAL
Status: DISCONTINUED | OUTPATIENT
Start: 2022-12-15 | End: 2022-12-17 | Stop reason: HOSPADM

## 2022-12-15 RX ORDER — PROPRANOLOL HYDROCHLORIDE 10 MG/1
10 TABLET ORAL 2 TIMES DAILY
Status: DISCONTINUED | OUTPATIENT
Start: 2022-12-15 | End: 2022-12-16

## 2022-12-15 RX ORDER — DEXTROSE 50 % IN WATER (D50W) INTRAVENOUS SYRINGE
25-50 AS NEEDED
Status: DISCONTINUED | OUTPATIENT
Start: 2022-12-15 | End: 2022-12-15

## 2022-12-15 RX ADMIN — SODIUM CHLORIDE, PRESERVATIVE FREE 10 ML: 5 INJECTION INTRAVENOUS at 15:01

## 2022-12-15 RX ADMIN — Medication 0.4 MG: at 17:26

## 2022-12-15 RX ADMIN — CEFTRIAXONE 2 G: 2 INJECTION, POWDER, FOR SOLUTION INTRAMUSCULAR; INTRAVENOUS at 10:28

## 2022-12-15 RX ADMIN — SODIUM CHLORIDE, PRESERVATIVE FREE 10 ML: 5 INJECTION INTRAVENOUS at 21:40

## 2022-12-15 RX ADMIN — TROSPIUM CHLORIDE 20 MG: 20 TABLET, FILM COATED ORAL at 17:26

## 2022-12-15 RX ADMIN — AZITHROMYCIN MONOHYDRATE 500 MG: 500 INJECTION, POWDER, LYOPHILIZED, FOR SOLUTION INTRAVENOUS at 10:56

## 2022-12-15 RX ADMIN — ACETAMINOPHEN 1000 MG: 500 TABLET ORAL at 09:09

## 2022-12-15 RX ADMIN — SODIUM CHLORIDE 1000 ML: 9 INJECTION, SOLUTION INTRAVENOUS at 09:09

## 2022-12-15 RX ADMIN — SODIUM CHLORIDE 682 ML: 9 INJECTION, SOLUTION INTRAVENOUS at 12:14

## 2022-12-15 RX ADMIN — SODIUM CHLORIDE 1000 ML: 9 INJECTION, SOLUTION INTRAVENOUS at 11:27

## 2022-12-15 RX ADMIN — ACETAMINOPHEN 650 MG: 325 TABLET ORAL at 17:32

## 2022-12-15 RX ADMIN — SODIUM CHLORIDE 75 ML/HR: 9 INJECTION, SOLUTION INTRAVENOUS at 15:01

## 2022-12-15 NOTE — ED PROVIDER NOTES
EMERGENCY DEPARTMENT HISTORY AND PHYSICAL EXAM      Date: 12/15/2022  Patient Name: Niraj Wheatley    History of Presenting Illness     Chief Complaint   Patient presents with    Fever    Vomiting    Cough    Diarrhea       History Provided By: Patient and Patient's     HPI: Niraj Wheatley, 74 y.o. female with history of rheumatoid arthritis on MTX, sleep apnea on CPAP, diabetes, hypertension, cholecystectomy, mitral valve prolapse, none alcoholic steatohepatitis, cardiomyopathy, GERD, overactive bladder;  obesity that presents with diarrhea that began at 4 AM followed by vomiting at 6:30 AM.  She states that she has a chronic cough that seem to have gotten worse since 4 AM.  States that he has chills. She denies abdominal pain, chest pain, productive sputum, headache, altered mental status, dysuria, hemoptysis, blood in the stool. He has no history of inflammatory bowel disease. There are no other complaints, changes, or physical findings at this time. Past History   Past Medical History:  Past Medical History:   Diagnosis Date    CPAP (continuous positive airway pressure) dependence     Diabetes ([address]     Ectopic pregnancy     Fatty liver     HTN (hypertension)     Rheumatoid arthritis ([facility] [address]     Sleep apnea        Past Surgical History:  Past Surgical History:   Procedure Laterality Date    HX CHOLECYSTECTOMY      HX TONSILLECTOMY         Family History:  Family History   Problem Relation Age of Onset    No Known Problems Mother     No Known Problems Father        Social History:  Social History     Tobacco Use    Smoking status: Never    Smokeless tobacco: Never   Vaping Use    Vaping Use: Never used   Substance Use Topics    Alcohol use: Not Currently    Drug use: Never       Allergies:   Allergies   Allergen Reactions    Hydrocodone-Acetaminophen Anxiety       PCP: Bruno Bennett MD    Current Facility-Administered Medications   Medication Dose Route Frequency Provider Last Rate Last Admin    sodium chloride (NS) flush 5-10 mL  5-10 mL IntraVENous PRN Deric Robin MD        cefTRIAXone (ROCEPHIN) 2 g in 0.9% sodium chloride (MBP/ADV) 50 mL MBP  2 g IntraVENous Q24H Deric DIAZ MD   IV Completed at 12/15/22 1058    azithromycin (ZITHROMAX) 500 mg in 0.9% sodium chloride 250 mL ([id number])  500 mg IntraVENous Q24H Blas Rueda  mL/hr at 12/15/22 1056 500 mg at 12/15/22 1056    sodium chloride 0.9 % bolus infusion 1,000 mL  1,000 mL IntraVENous ONCE Blas Rueda MD 1,000 mL/hr at 12/15/22 1127 1,000 mL at 12/15/22 1127    sodium chloride 0.9 % bolus infusion 682 mL  682 mL IntraVENous ONCE Blas Rueda  mL/hr at 12/15/22 1214 682 mL at 12/15/22 1214     Current Outpatient Medications   Medication Sig Dispense Refill    baricitinib (OLUMIANT) 2 mg tablet Olumiant 2 mg tablet   Take 1 tablet every day by oral route.  omeprazole (PRILOSEC) 20 mg capsule Take 1 Capsule by mouth daily. 90 Capsule 1    metFORMIN (GLUCOPHAGE) 500 mg tablet Take 500 mg by mouth two (2) times daily (with meals).  aspirin delayed-release 81 mg tablet Take 81 mg by mouth daily.  propranoloL (INDERAL) 10 mg tablet Take 10 mg by mouth two (2) times a day.  methotrexate (RHEUMATREX) 2.5 mg tablet Take 15 mg by mouth every Monday.  oxybutynin chloride XL (DITROPAN XL) 10 mg CR tablet Take 10 mg by mouth daily. Review of Systems   Review of Systems   Constitutional:  Positive for chills and fever. HENT:  Negative for rhinorrhea and sore throat. Eyes:  Negative for redness and visual disturbance. Respiratory:  Positive for cough. Negative for shortness of breath and wheezing. Cardiovascular:  Negative for chest pain and palpitations. Gastrointestinal:  Positive for diarrhea, nausea and vomiting. Negative for abdominal pain and blood in stool. Genitourinary:  Negative for dysuria and flank pain.    Musculoskeletal: Negative for arthralgias and myalgias. Skin:  Negative for rash. Neurological:  Negative for headaches. Psychiatric/Behavioral:  Negative for confusion. Physical Exam   Physical Exam  Vitals and nursing note reviewed. Constitutional:       General: She is not in acute distress. Appearance: She is well-developed. She is obese. She is not ill-appearing. Comments: Alert and oriented x3, in no acute distress. HENT:      Head: Normocephalic. Nose: No congestion. Mouth/Throat:      Pharynx: No oropharyngeal exudate. Eyes:      Extraocular Movements: Extraocular movements intact. Conjunctiva/sclera: Conjunctivae normal.      Pupils: Pupils are equal, round, and reactive to light. Neck:      Thyroid: No thyromegaly. Vascular: No JVD. Cardiovascular:      Rate and Rhythm: Normal rate and regular rhythm. Heart sounds: No murmur heard. No gallop. Pulmonary:      Effort: Pulmonary effort is normal. No respiratory distress. Breath sounds: Rales present. No wheezing. Comments: Inspiratory rales bilateral laterally over the lower half of the lungs  Abdominal:      General: Bowel sounds are normal. There is no distension. Palpations: Abdomen is soft. Tenderness: There is no abdominal tenderness. Musculoskeletal:         General: Normal range of motion. Cervical back: Neck supple. Right lower leg: Edema present. Left lower leg: Edema present. Comments: +1 bilateral pitting edema with no asymmetry or warmth. Skin:     General: Skin is warm. Findings: No erythema. Neurological:      Mental Status: She is alert and oriented to person, place, and time.    Psychiatric:         Behavior: Behavior normal.       Lab and Diagnostic Study Results   Labs -     Recent Results (from the past 12 hour(s))   CBC WITH AUTOMATED DIFF    Collection Time: 12/15/22  8:35 AM   Result Value Ref Range    WBC 12.3 4.6 - 13.2 K/uL    RBC 3.50 (L) 4.20 - 5.30 M/uL    HGB 11.2 (L) 12.0 - 16.0 g/dL    HCT 34.9 (L) 35.0 - 45.0 %    MCV 99.7 78.0 - 100.0 FL    MCH 32.0 24.0 - 34.0 PG    MCHC 32.1 31.0 - 37.0 g/dL    RDW 15.5 (H) 11.6 - 14.5 %    PLATELET 320 407 - 439 K/uL    MPV 10.1 9.2 - 11.8 FL    NRBC 0.0 0.0  WBC    ABSOLUTE NRBC 0.00 0.00 - 0.01 K/uL    NEUTROPHILS 92 (H) 40 - 73 %    LYMPHOCYTES 5 (L) 21 - 52 %    MONOCYTES 3 3 - 10 %    EOSINOPHILS 0 0 - 5 %    BASOPHILS 0 0 - 2 %    IMMATURE GRANULOCYTES 0 0 - 0.5 %    ABS. NEUTROPHILS 11.3 (H) 1.8 - 8.0 K/UL    ABS. LYMPHOCYTES 0.6 (L) 0.9 - 3.6 K/UL    ABS. MONOCYTES 0.3 0.05 - 1.2 K/UL    ABS. EOSINOPHILS 0.0 0.0 - 0.4 K/UL    ABS. BASOPHILS 0.0 0.0 - 0.1 K/UL    ABS. IMM. GRANS. 0.0 0.00 - 0.04 K/UL    DF AUTOMATED     METABOLIC PANEL, COMPREHENSIVE    Collection Time: 12/15/22  8:35 AM   Result Value Ref Range    Sodium 137 136 - 145 mmol/L    Potassium 4.5 3.5 - 5.5 mmol/L    Chloride 103 100 - 111 mmol/L    CO2 24 21 - 32 mmol/L    Anion gap 10 3.0 - 18.0 mmol/L    Glucose 183 (H) 74 - 99 mg/dL    BUN 23 (H) 7 - 18 mg/dL    Creatinine 0.93 0.60 - 1.30 mg/dL    BUN/Creatinine ratio 25 (H) 12 - 20      eGFR >60 >60 ml/min/1.73m2    Calcium 9.1 8.5 - 10.1 mg/dL    Bilirubin, total 0.5 0.2 - 1.0 mg/dL    AST (SGOT) 32 10 - 38 U/L    ALT (SGPT) 44 13 - 56 U/L    Alk.  phosphatase 75 45 - 117 U/L    Protein, total 7.5 6.4 - 8.2 g/dL    Albumin 3.1 (L) 3.4 - 5.0 g/dL    Globulin 4.4 (H) 2.0 - 4.0 g/dL    A-G Ratio 0.7 (L) 0.8 - 1.7     LIPASE    Collection Time: 12/15/22  8:35 AM   Result Value Ref Range    Lipase 185 73 - 393 U/L   INFLUENZA A & B AG (RAPID TEST)    Collection Time: 12/15/22  8:35 AM   Result Value Ref Range    Influenza A Antigen Negative Negative      Influenza B Antigen Positive (A) Negative     LACTIC ACID    Collection Time: 12/15/22  8:35 AM   Result Value Ref Range    Lactic acid 3.3 (HH) 0.4 - 2.0 mmol/L   TROPONIN-HIGH SENSITIVITY    Collection Time: 12/15/22  8:35 AM Result Value Ref Range    Troponin-High Sensitivity 11 0 - 54 ng/L   PROCALCITONIN    Collection Time: 12/15/22  8:35 AM   Result Value Ref Range    Procalcitonin 0.20 ng/mL   NT-PRO BNP    Collection Time: 12/15/22  8:35 AM   Result Value Ref Range    NT pro- 0 - 900 pg/mL   GLUCOSE, POC    Collection Time: 12/15/22  8:37 AM   Result Value Ref Range    Glucose (POC) 180 (H) 70 - 110 mg/dL    Performed by Asiya Garcia    EKG, 12 LEAD, INITIAL    Collection Time: 12/15/22  8:58 AM   Result Value Ref Range    Ventricular Rate 102 BPM    Atrial Rate 103 BPM    P-R Interval 136 ms    QRS Duration 89 ms    Q-T Interval 314 ms    QTC Calculation (Bezet) 409 ms    Calculated P Axis 48 degrees    Calculated R Axis -15 degrees    Calculated T Axis 33 degrees    Diagnosis       Sinus tachycardia  Probable left atrial enlargement  Borderline left axis deviation  RSR' in V1 or V2, probably normal variant    Confirmed by Former[facility], [address] [phone]) on 12/15/2022 12:09:35 PM     RSV AG - RAPID    Collection Time: 12/15/22  9:00 AM   Result Value Ref Range    RSV Antigen Negative     COVID-19 RAPID TEST    Collection Time: 12/15/22  9:00 AM   Result Value Ref Range    COVID-19 rapid test Not Detected Not Detected     LACTIC ACID    Collection Time: 12/15/22  9:34 AM   Result Value Ref Range    Lactic acid 3.1 (HH) 0.4 - 2.0 mmol/L   URINALYSIS W/ RFLX MICROSCOPIC    Collection Time: 12/15/22 11:52 AM   Result Value Ref Range    Color Yellow      Appearance Cloudy      Specific gravity 1.019 1.005 - 1.030      pH (UA) 6.0 5.0 - 8.0      Protein 30 (A) Negative mg/dL    Glucose Negative Negative mg/dL    Ketone Negative Negative mg/dL    Bilirubin Negative Negative      Blood Moderate (A) Negative      Urobilinogen 0.2 0.2 - 1.0 EU/dL    Nitrites Positive (A) Negative      Leukocyte Esterase Negative Negative         Radiologic Studies -   [unfilled]  CT Results  (Last 48 hours)      None          CXR Results  (Last 48 hours)                 12/15/22 0924  XR CHEST PORT Final result    Impression:      Findings suggestive of left basilar infiltrate. Recommend follow-up chest x-ray   to document resolution. Narrative:  CHEST AP PORTABLE       Indication: Cough. Comparison: None. Findings: There is hazy alveolar opacity in the left lower lung zone. Remaining   lungs appear clear. The cardiac silhouette and pulmonary vascularity appear   within normal limits. No evidence for pneumothorax or pleural effusion. Medical Decision Making and ED Course   Differential Diagnosis & Medical Decision Making Provider Note:   Genitourinary, hepatobiliary, GI tract, pulmonary, indwelling catheter, surgical wound, decubitus ulcer,. Differential of cardiogenic, hypovolemic, neurogenic, anaphylactic, or infectious shock. - I am the first and primary provider for this patient. I reviewed the vital signs, available nursing notes, past medical history, past surgical history, family history and social history. The patient's presenting problems have been discussed, and the staff are in agreement with the care plan formulated and outlined with them. I have encouraged them to ask questions as they arise throughout their visit. Vital Signs-Reviewed the patient's vital signs. Patient Vitals for the past 12 hrs:   Temp Pulse Resp BP SpO2   12/15/22 1202 98.5 °F (36.9 °C) 90 16 (!) 109/56 96 %   12/15/22 1104 -- 96 19 (!) 105/47 96 %   12/15/22 1002 98.6 °F (37 °C) 93 24 (!) 117/47 97 %   12/15/22 0947 -- 88 21 (!) 111/50 99 %   12/15/22 0938 -- 99 -- 119/61 92 %   12/15/22 0916 -- 99 22 (!) 118/49 96 %   12/15/22 0840 (!) 102.9 °F (39.4 °C) (!) 110 20 (!) 129/97 91 %       EKG interpretation: (Preliminary): EKG Interpreted by me. [id number] Shows tachycardia 102/min. Normal AR, normal QRS, slight left axis deviation, normal QTC. RR 1 in V1 and V2. No acute ST segment changes.     ED Course:    Pt w h/o CMP and jose alfredolaeriberto pan; will give fluids judiciously until labs/cxr back    12:18 PM  Will give sepsis fluids now; still alert; good cap refill. Discussed with NP Federico; will admit for sepsis; pneumonia; influenza B    Procedures and Critical Care   12:38 PM  I have spent 30 minutes of critical care time involved in lab review, consultations with specialist, family decision-making, and documentation. During this entire length of time I was immediately available to the patient. Critical Care: The reason for providing this level of medical care for this critically ill patient was due a critical illness that impaired one or more vital organ systems such that there was a high probability of imminent or life threatening deterioration in the patients condition. This care involved high complexity decision making to assess, manipulate, and support vital system functions, to treat this degreee vital organ system failure and to prevent further life threatening deterioration of the patients condition. Disposition   Disposition: ADMIT      Diagnosis/Clinical Impression     Clinical Impression:   1. Pneumonia of left lower lobe due to infectious organism    2. Influenza B    3. Sepsis, due to unspecified organism, unspecified whether acute organ dysfunction present [facility])        Attestations: Suly Vela MD, am the primary clinician of record. Please note that this dictation was completed with [employer], the computer voice recognition software. Quite often unanticipated grammatical, syntax, homophones, and other interpretive errors are inadvertently transcribed by the computer software. Please disregard these errors. Please excuse any errors that have escaped final proofreading. Thank you.

## 2022-12-15 NOTE — H&P
History and Physical    Subjective:     Deepak Rivera is a 74 y.o. female with a past medical history for Hypertension, Diabetes Mellitus Type 2, rheumatoid arthritis, fatty liver disease, and sleep apnea, patient presented to the ED with a chief complaint of diarrhea. Patient reports that the diarrhea started this am around 0400, she report x 2 loose watery stools (none since arriving to the ED). Other accompanying symptoms includes, nausea, vomiting, weakness/fatigue, fever, chills, and nonproductive cough, she denies abdominal pain, bloating, chest pain, palpitations, shortness of breath. In the ED sepsis alert was called due to tachycardia and febrile, sepsis workup initiated, lactic acid 3.3, CXR findings suggestive of left basilar infiltrate, influenza B positive. Patient received 2 L of IVF, started on IV Rocephin and Azithromycin, and placed on 2 L supplemental oxygen for an oxygen saturation of 91%. Hospital medicine consulted for admission. Patient assessed in the ED, at the bedside, patient is alert and oriented, there is no acute distress noted. Patient agrees to admission for a diagnosis of sepsis likely secondary to pneumonia, treatment to include IV hydration, IV antibiotics, and supplemental oxygen. Admit to telemetry. Past Medical History:   Diagnosis Date    CPAP (continuous positive airway pressure) dependence     Diabetes (HCC)     Ectopic pregnancy     Fatty liver     HTN (hypertension)     Rheumatoid arthritis ([facility] [address]     Sleep apnea       Past Surgical History:   Procedure Laterality Date    HX CHOLECYSTECTOMY      HX TONSILLECTOMY       Family History   Problem Relation Age of Onset    No Known Problems Mother     No Known Problems Father       Social History     Tobacco Use    Smoking status: Never    Smokeless tobacco: Never   Substance Use Topics    Alcohol use: Not Currently       Prior to Admission medications    Medication Sig Start Date End Date Taking?  Authorizing Provider baricitinib (OLUMIANT) 2 mg tablet Olumiant 2 mg tablet   Take 1 tablet every day by oral route. Yes Provider, Historical   omeprazole (PRILOSEC) 20 mg capsule Take 1 Capsule by mouth daily. 5/24/21  Yes Kermit Cortes MD   metFORMIN (GLUCOPHAGE) 500 mg tablet Take 500 mg by mouth two (2) times daily (with meals). Yes Provider, Historical   aspirin delayed-release 81 mg tablet Take 81 mg by mouth daily. Yes Provider, Historical   propranoloL (INDERAL) 10 mg tablet Take 10 mg by mouth two (2) times a day. Yes Provider, Historical   methotrexate (RHEUMATREX) 2.5 mg tablet Take 15 mg by mouth every Monday. Yes Provider, Historical   oxybutynin chloride XL (DITROPAN XL) 10 mg CR tablet Take 10 mg by mouth daily. Yes Provider, Historical     Allergies   Allergen Reactions    Hydrocodone-Acetaminophen Anxiety          REVIEW OF SYSTEMS:       Total of 12 systems reviewed as follows:    Positive = Red  Constitutional: Positive for malaise/fatigue and weakness, positive for fever and chills   HENT: Negative for ear pain, headaches, negative for loss of sense of taste and smell   Eyes: Negative for blurred vision and double vision   Skin: Negative for itching, negative for open areas   Cardiovascular: Negative for chest pain, palpitations, negative for swelling   Respiratory: Positive for cough.  Negative for shortness or breath, negative negative for sputum production   Gastrointestinal: Negative for abdominal pain, constipation, Positive for nausea, vomiting, and diarrhea   Genitourinary: Negative for dysuria, frequency, and hematuria   Musculoskeletal: Negative for joint pain and myalgias   Neurological: Negative for dizziness, seizures, and headaches   Psychiatric: Negative for depression and anxiousness           Objective:   VITALS:    Visit Vitals  BP (!) 98/53 (BP 1 Location: Left upper arm, BP Patient Position: At rest)   Pulse 85   Temp 98.2 °F (36.8 °C)   Resp 20   Ht 5' 2\" (1.575 m)   Wt 89.4 kg (197 lb)   SpO2 98%   BMI 36.03 kg/m²       PHYSICAL EXAM:  Positive = Red  Constitutional: Alert and oriented x 3 and no noted acute distress appears to be stated age. HENT: Atraumatic, nose midline, oropharynx clear ad moist, trachea midline, no supraclavicular   Eyes: Conjunctiva normal and pupils equal   Skin: Dry, intact, warm, and dry   Cardiovascular: Regular rate and rhythm, normal heart sounds, no murmurs, pulses palpable, no noted edema   Respiratory: Lungs clear throughout, no wheezes, rales, or rhonchi, effort normal   Gastrointestinal: Appearance normal, bowel sounds are normal, bowl soft and non-tender   Genitourinary: Deferred   Musculoskeletal: Normal ROM   Neurological: Alert and oriented x 3, awake. No facial droop. No slurred speech. Hand grasps equal. Strength 5/5 in all extremities.  Intact sensations   Psychiatric: Affect normal, Answers questions appropriately     __________________________________________________  Care Plan discussed with:    Comments   Patient X    Family      RN     Care Manager                    Consultant:      _______________________________________________________________________  Expected  Disposition:   Home with Family X   HH/PT/OT/RN    SNF/LTC    [place]    ________________________________________________________________________    Labs:  Recent Results (from the past 24 hour(s))   CBC WITH AUTOMATED DIFF    Collection Time: 12/15/22  8:35 AM   Result Value Ref Range    WBC 12.3 4.6 - 13.2 K/uL    RBC 3.50 (L) 4.20 - 5.30 M/uL    HGB 11.2 (L) 12.0 - 16.0 g/dL    HCT 34.9 (L) 35.0 - 45.0 %    MCV 99.7 78.0 - 100.0 FL    MCH 32.0 24.0 - 34.0 PG    MCHC 32.1 31.0 - 37.0 g/dL    RDW 15.5 (H) 11.6 - 14.5 %    PLATELET 805 823 - 050 K/uL    MPV 10.1 9.2 - 11.8 FL    NRBC 0.0 0.0  WBC    ABSOLUTE NRBC 0.00 0.00 - 0.01 K/uL    NEUTROPHILS 92 (H) 40 - 73 %    LYMPHOCYTES 5 (L) 21 - 52 %    MONOCYTES 3 3 - 10 %    EOSINOPHILS 0 0 - 5 %    BASOPHILS 0 0 - 2 % IMMATURE GRANULOCYTES 0 0 - 0.5 %    ABS. NEUTROPHILS 11.3 (H) 1.8 - 8.0 K/UL    ABS. LYMPHOCYTES 0.6 (L) 0.9 - 3.6 K/UL    ABS. MONOCYTES 0.3 0.05 - 1.2 K/UL    ABS. EOSINOPHILS 0.0 0.0 - 0.4 K/UL    ABS. BASOPHILS 0.0 0.0 - 0.1 K/UL    ABS. IMM. GRANS. 0.0 0.00 - 0.04 K/UL    DF AUTOMATED     METABOLIC PANEL, COMPREHENSIVE    Collection Time: 12/15/22  8:35 AM   Result Value Ref Range    Sodium 137 136 - 145 mmol/L    Potassium 4.5 3.5 - 5.5 mmol/L    Chloride 103 100 - 111 mmol/L    CO2 24 21 - 32 mmol/L    Anion gap 10 3.0 - 18.0 mmol/L    Glucose 183 (H) 74 - 99 mg/dL    BUN 23 (H) 7 - 18 mg/dL    Creatinine 0.93 0.60 - 1.30 mg/dL    BUN/Creatinine ratio 25 (H) 12 - 20      eGFR >60 >60 ml/min/1.73m2    Calcium 9.1 8.5 - 10.1 mg/dL    Bilirubin, total 0.5 0.2 - 1.0 mg/dL    AST (SGOT) 32 10 - 38 U/L    ALT (SGPT) 44 13 - 56 U/L    Alk.  phosphatase 75 45 - 117 U/L    Protein, total 7.5 6.4 - 8.2 g/dL    Albumin 3.1 (L) 3.4 - 5.0 g/dL    Globulin 4.4 (H) 2.0 - 4.0 g/dL    A-G Ratio 0.7 (L) 0.8 - 1.7     LIPASE    Collection Time: 12/15/22  8:35 AM   Result Value Ref Range    Lipase 185 73 - 393 U/L   INFLUENZA A & B AG (RAPID TEST)    Collection Time: 12/15/22  8:35 AM   Result Value Ref Range    Influenza A Antigen Negative Negative      Influenza B Antigen Positive (A) Negative     LACTIC ACID    Collection Time: 12/15/22  8:35 AM   Result Value Ref Range    Lactic acid 3.3 (HH) 0.4 - 2.0 mmol/L   TROPONIN-HIGH SENSITIVITY    Collection Time: 12/15/22  8:35 AM   Result Value Ref Range    Troponin-High Sensitivity 11 0 - 54 ng/L   PROCALCITONIN    Collection Time: 12/15/22  8:35 AM   Result Value Ref Range    Procalcitonin 0.20 ng/mL   NT-PRO BNP    Collection Time: 12/15/22  8:35 AM   Result Value Ref Range    NT pro- 0 - 900 pg/mL   GLUCOSE, POC    Collection Time: 12/15/22  8:37 AM   Result Value Ref Range    Glucose (POC) 180 (H) 70 - 110 mg/dL    Performed by Kathleen Pulido    EKG, 12 LEAD, INITIAL    Collection Time: 12/15/22  8:58 AM   Result Value Ref Range    Ventricular Rate 102 BPM    Atrial Rate 103 BPM    P-R Interval 136 ms    QRS Duration 89 ms    Q-T Interval 314 ms    QTC Calculation (Bezet) 409 ms    Calculated P Axis 48 degrees    Calculated R Axis -15 degrees    Calculated T Axis 33 degrees    Diagnosis       Sinus tachycardia  Probable left atrial enlargement  Borderline left axis deviation  RSR' in V1 or V2, probably normal variant    Confirmed by [facility] FANNYBang MEDINA [phone]) on 12/15/2022 12:09:35 PM     RSV AG - RAPID    Collection Time: 12/15/22  9:00 AM   Result Value Ref Range    RSV Antigen Negative     COVID-19 RAPID TEST    Collection Time: 12/15/22  9:00 AM   Result Value Ref Range    COVID-19 rapid test Not Detected Not Detected     CULTURE, BLOOD    Collection Time: 12/15/22  9:30 AM    Specimen: Blood   Result Value Ref Range    Special Requests: RTAC     Culture result: No growth after 2 hours     CULTURE, BLOOD    Collection Time: 12/15/22  9:34 AM    Specimen: Blood   Result Value Ref Range    Special Requests: Jessica Yodit     Culture result: No growth after 2 hours     LACTIC ACID    Collection Time: 12/15/22  9:34 AM   Result Value Ref Range    Lactic acid 3.1 (HH) 0.4 - 2.0 mmol/L   URINALYSIS W/ RFLX MICROSCOPIC    Collection Time: 12/15/22 11:52 AM   Result Value Ref Range    Color Yellow      Appearance Cloudy      Specific gravity 1.019 1.005 - 1.030      pH (UA) 6.0 5.0 - 8.0      Protein 30 (A) Negative mg/dL    Glucose Negative Negative mg/dL    Ketone Negative Negative mg/dL    Bilirubin Negative Negative      Blood Moderate (A) Negative      Urobilinogen 0.2 0.2 - 1.0 EU/dL    Nitrites Positive (A) Negative      Leukocyte Esterase Negative Negative     URINE MICROSCOPIC    Collection Time: 12/15/22 11:52 AM   Result Value Ref Range    WBC 0-4 0 - 4 /hpf    RBC 5-10 0 - 2 /hpf    Epithelial cells Few 0 - 20 /lpf    Bacteria 4+ (A) None /hpf    Yeast Present Imaging:  XR CHEST PORT    Result Date: 12/15/2022  CHEST AP PORTABLE Indication: Cough. Comparison: None. Findings: There is hazy alveolar opacity in the left lower lung zone. Remaining lungs appear clear. The cardiac silhouette and pulmonary vascularity appear within normal limits. No evidence for pneumothorax or pleural effusion. Findings suggestive of left basilar infiltrate. Recommend follow-up chest x-ray to document resolution. Assessment & Plan:     Sepsis  -SIRS criteria met: Tachycardia 110 and febrile 102.9, likely secondary to pneumonia  -lactic acid 3.3, will trend  -continue supplemental oxygen to keep oxygen saturation >92%, wean as tolerated  -CXR: findings suggestive of left basilar infiltrate  -patient received 2 L of IVF in the ED will continue NS @ 75 ml/hr  -Influenza B positive  -patient started on IV Rocephin and Azithromycin in the ED, will continue Rocephin 1 gram every 24 hrs and Azithromycin 500 mg every 24 hrs   -Tylenol for temperature greater than 100.4  -repeat CBC in the am    Pneumonia  -see sepsis plan    Influenza B  -droplet precautions  -Tylenol for temperature < 100.4    Diabetes Mellitus 2  -holding Metformin  -implement sliding scale and POC before meals and bedtime  -diabetic diet    Rheumatoid Arthritis  -chronic  -continue Olumiant and Rheumatrex    Hypertension  -BP soft  -will hold Inderal for now and restart once BP improves  -monitor BP closely      TOTAL TIME:  45 Minutes    Code Status: Full    Prophylaxis: Lovenox    Electronically Signed : OLGA Boyle-BC [address]    Please note that this dictation was completed with [employer], the computer voice recognition software. Quite often unanticipated grammatical, syntax, homophones, and other interpretive errors are inadvertently transcribed by the computer software. Please disregard these errors. Please excuse any errors that have escaped final proofreading. Thank you.

## 2022-12-15 NOTE — PROGRESS NOTES
5020- patient arrived to room 251 alert and oriented, telemetry on running sinus rhythm, ambulatory to bed,  at bedside

## 2022-12-15 NOTE — ROUTINE PROCESS
TRANSFER - OUT REPORT:    Verbal report given to Bettye Butt RN(name) on Molly Del Rosario  being transferred to [address] [place] [address], Room 251(unit) for routine progression of care       Report consisted of patients Situation, Background, Assessment and   Recommendations(SBAR). Information from the following report(s) SBAR, ED Summary, Procedure Summary, MAR, Recent Results, and Cardiac Rhythm Sinus rhythm  was reviewed with the receiving nurse. Lines:   Peripheral IV 12/15/22 Right Antecubital (Active)   Site Assessment Clean, dry, & intact 12/15/22 0838   Phlebitis Assessment 0 12/15/22 0838   Infiltration Assessment 0 12/15/22 0838   Dressing Status Clean, dry, & intact 12/15/22 0838   Dressing Type Transparent 12/15/22 0838   Hub Color/Line Status Patent; Flushed 12/15/22 0795        Opportunity for questions and clarification was provided.       Patient transported with:   Monitor, oxygen at 2 l/min via nc

## 2022-12-15 NOTE — ED NOTES
MD aware that patient's blood pressure is 94/46. Patient asymptomatic. IV fluids continue to infuse. No further orders.

## 2022-12-15 NOTE — ED NOTES
Patient resting in bed. No s/sx of distress noted. Patient  Denies needs or concerns. Call bell in reach.

## 2022-12-16 LAB
ANION GAP SERPL CALC-SCNC: 8 MMOL/L (ref 3–18)
BUN SERPL-MCNC: 14 MG/DL (ref 7–18)
BUN/CREAT SERPL: 22 (ref 12–20)
CA-I BLD-MCNC: 8.8 MG/DL (ref 8.5–10.1)
CHLORIDE SERPL-SCNC: 107 MMOL/L (ref 100–111)
CO2 SERPL-SCNC: 26 MMOL/L (ref 21–32)
CREAT SERPL-MCNC: 0.64 MG/DL (ref 0.6–1.3)
ERYTHROCYTE [DISTWIDTH] IN BLOOD BY AUTOMATED COUNT: 15.7 % (ref 11.6–14.5)
GLUCOSE BLD STRIP.AUTO-MCNC: 126 MG/DL (ref 70–110)
GLUCOSE BLD STRIP.AUTO-MCNC: 128 MG/DL (ref 70–110)
GLUCOSE BLD STRIP.AUTO-MCNC: 143 MG/DL (ref 70–110)
GLUCOSE BLD STRIP.AUTO-MCNC: 191 MG/DL (ref 70–110)
GLUCOSE SERPL-MCNC: 124 MG/DL (ref 74–99)
HCT VFR BLD AUTO: 28.7 % (ref 35–45)
HGB BLD-MCNC: 9.1 G/DL (ref 12–16)
LACTATE SERPL-SCNC: 1.1 MMOL/L (ref 0.4–2)
MAGNESIUM SERPL-MCNC: 1.9 MG/DL (ref 1.6–2.6)
MCH RBC QN AUTO: 32 PG (ref 24–34)
MCHC RBC AUTO-ENTMCNC: 31.7 G/DL (ref 31–37)
MCV RBC AUTO: 101.1 FL (ref 78–100)
NRBC # BLD: 0 K/UL (ref 0–0.01)
NRBC BLD-RTO: 0 PER 100 WBC
PERFORMED BY, TECHID: ABNORMAL
PLATELET # BLD AUTO: 307 K/UL (ref 135–420)
PMV BLD AUTO: 10.4 FL (ref 9.2–11.8)
POTASSIUM SERPL-SCNC: 4.3 MMOL/L (ref 3.5–5.5)
RBC # BLD AUTO: 2.84 M/UL (ref 4.2–5.3)
SODIUM SERPL-SCNC: 141 MMOL/L (ref 136–145)
WBC # BLD AUTO: 15.3 K/UL (ref 4.6–13.2)

## 2022-12-16 PROCEDURE — 77010033678 HC OXYGEN DAILY

## 2022-12-16 PROCEDURE — 83735 ASSAY OF MAGNESIUM: CPT

## 2022-12-16 PROCEDURE — 74011000258 HC RX REV CODE- 258: Performed by: INTERNAL MEDICINE

## 2022-12-16 PROCEDURE — 83605 ASSAY OF LACTIC ACID: CPT

## 2022-12-16 PROCEDURE — 94761 N-INVAS EAR/PLS OXIMETRY MLT: CPT

## 2022-12-16 PROCEDURE — 65270000029 HC RM PRIVATE

## 2022-12-16 PROCEDURE — 74011250636 HC RX REV CODE- 250/636: Performed by: INTERNAL MEDICINE

## 2022-12-16 PROCEDURE — 82962 GLUCOSE BLOOD TEST: CPT

## 2022-12-16 PROCEDURE — 74011250636 HC RX REV CODE- 250/636: Performed by: NURSE PRACTITIONER

## 2022-12-16 PROCEDURE — 85027 COMPLETE CBC AUTOMATED: CPT

## 2022-12-16 PROCEDURE — 74011636637 HC RX REV CODE- 636/637: Performed by: NURSE PRACTITIONER

## 2022-12-16 PROCEDURE — 80048 BASIC METABOLIC PNL TOTAL CA: CPT

## 2022-12-16 PROCEDURE — 36415 COLL VENOUS BLD VENIPUNCTURE: CPT

## 2022-12-16 PROCEDURE — 74011250637 HC RX REV CODE- 250/637: Performed by: NURSE PRACTITIONER

## 2022-12-16 PROCEDURE — 74011000250 HC RX REV CODE- 250: Performed by: NURSE PRACTITIONER

## 2022-12-16 RX ORDER — PROPRANOLOL HYDROCHLORIDE 10 MG/1
20 TABLET ORAL 2 TIMES DAILY
Status: DISCONTINUED | OUTPATIENT
Start: 2022-12-16 | End: 2022-12-17 | Stop reason: HOSPADM

## 2022-12-16 RX ORDER — SIMVASTATIN 20 MG/1
20 TABLET, FILM COATED ORAL
COMMUNITY

## 2022-12-16 RX ORDER — ATORVASTATIN CALCIUM 10 MG/1
10 TABLET, FILM COATED ORAL
Status: DISCONTINUED | OUTPATIENT
Start: 2022-12-16 | End: 2022-12-17 | Stop reason: HOSPADM

## 2022-12-16 RX ADMIN — AZITHROMYCIN MONOHYDRATE 500 MG: 500 INJECTION, POWDER, LYOPHILIZED, FOR SOLUTION INTRAVENOUS at 09:47

## 2022-12-16 RX ADMIN — Medication 81 MG: at 09:45

## 2022-12-16 RX ADMIN — Medication 0.4 MG: at 17:02

## 2022-12-16 RX ADMIN — ATORVASTATIN CALCIUM 10 MG: 10 TABLET, FILM COATED ORAL at 21:28

## 2022-12-16 RX ADMIN — TROSPIUM CHLORIDE 20 MG: 20 TABLET, FILM COATED ORAL at 17:01

## 2022-12-16 RX ADMIN — SODIUM CHLORIDE, PRESERVATIVE FREE 10 ML: 5 INJECTION INTRAVENOUS at 05:12

## 2022-12-16 RX ADMIN — PANTOPRAZOLE SODIUM 40 MG: 40 TABLET, DELAYED RELEASE ORAL at 09:45

## 2022-12-16 RX ADMIN — SODIUM CHLORIDE, PRESERVATIVE FREE 10 ML: 5 INJECTION INTRAVENOUS at 21:38

## 2022-12-16 RX ADMIN — CEFTRIAXONE 2 G: 2 INJECTION, POWDER, FOR SOLUTION INTRAMUSCULAR; INTRAVENOUS at 11:12

## 2022-12-16 RX ADMIN — SODIUM CHLORIDE, PRESERVATIVE FREE 10 ML: 5 INJECTION INTRAVENOUS at 14:25

## 2022-12-16 RX ADMIN — PROPRANOLOL HYDROCHLORIDE 20 MG: 10 TABLET ORAL at 17:01

## 2022-12-16 RX ADMIN — ACETAMINOPHEN 650 MG: 325 TABLET ORAL at 21:59

## 2022-12-16 RX ADMIN — INSULIN LISPRO 2 UNITS: 100 INJECTION, SOLUTION INTRAVENOUS; SUBCUTANEOUS at 11:21

## 2022-12-16 RX ADMIN — ACETAMINOPHEN 650 MG: 325 TABLET ORAL at 10:05

## 2022-12-16 NOTE — PROGRESS NOTES
Care Management Interventions  PCP Verified by CM: Yes  Last Visit to PCP: 10/17/22  Transition of Care Consult (CM Consult): Discharge Planning  Physical Therapy Consult: No  Occupational Therapy Consult: No  Speech Therapy Consult: No  Support Systems: Spouse/Significant Other  Confirm Follow Up Transport: Family  The Plan for Transition of Care is Related to the Following Treatment Goals : Patient centered discharge planning to ensure smooth transition to comunity and PLOF. Discharge Location  Patient Expects to be Discharged to[de-identified] Home     Reason for Admission:  Chart reviewed to show pt presented to ED with complaints of diarrhea, nausea, vomiting, weakness, fatigue, fever, chills and cough. Pt found to be febrile and tachycardic with lactic elevated at 3.3. Pt also found to be FLU B positive. Hospitalist consulted for admission. RUR Score:    10%                   PCP: First and Last name:  Calos Sotomayor MD     Name of Practice:    Are you a current patient: Yes/No:    Approximate date of last visit:  10-17-22   Can you participate in a virtual visit with your PCP:                     Current Advanced Directive/Advance Care Plan: Full Code      Healthcare Decision Maker:   Click here to complete [address] including selection of the Healthcare Decision Maker Relationship (ie \"Primary\")                             Transition of Care Plan:      Plan of care includes medication reconciliation to be complete, education will be given with teach back method, and will identify need for post-acute care follow up. Patient centered discharge planning to ensure smooth transition to community and PLOF. Pt lives at home with . CM following for DC needs.

## 2022-12-16 NOTE — PROGRESS NOTES
0700- completed bedside shift report, assumed care of the patient    0945- administered scheduled medications    1130- lunch provided and administered insulin    1309- patient resting in bed watching tv, no needs at this time    1422- discontinued IV fluids per Dr orders    1630- dinner provided    1702- patient up to shower and scheduled medication

## 2022-12-16 NOTE — PROGRESS NOTES
Hospitalist Progress Note    Daily Progress Note: 2022 12:54 PM      Clare Monreal                                            MRN: [id number]                                  :1950      Subjective:     Pt examined and seen at bedside. Patient is alert and oriented x 4, there are no noted s/sx of distress. Patient reports that she is feeling better, denies shortness of breath, fever, chills, and chest pain, patient weaned to RA. Patient is voicing that she would like to be discharged if possible in the am, she was made aware that as long as her WBC continue to trend down and she remains stable, there is a possibility for discharge tomorrow. No acute events reported overnight. Objective:     Visit Vitals  BP (!) 128/92   Pulse 88   Temp 97.2 °F (36.2 °C)   Resp 18   Ht 5' 2\" (1.575 m)   Wt 89.4 kg (197 lb)   SpO2 96%   BMI 36.03 kg/m²    O2 Flow Rate (L/min): 2 l/min O2 Device: None (Room air)    Temp (24hrs), Av.6 °F (37 °C), Min:97.2 °F (36.2 °C), Max:99.4 °F (37.4 °C)      No intake/output data recorded.  1901 -  0700  In: 1982 [I.V.:1982]  Out: -     PHYSICAL EXAM:  Physical Exam  Vitals and nursing note reviewed. Constitutional:       Appearance: Normal appearance. She is normal weight. HENT:      Head: Normocephalic and atraumatic. Mouth/Throat:      Mouth: Mucous membranes are moist.   Eyes:      Extraocular Movements: Extraocular movements intact. Pupils: Pupils are equal, round, and reactive to light. Cardiovascular:      Rate and Rhythm: Normal rate and regular rhythm. Pulses: Normal pulses. Heart sounds: Normal heart sounds. Pulmonary:      Effort: Pulmonary effort is normal.      Breath sounds: Normal breath sounds. Abdominal:      General: Abdomen is flat. Bowel sounds are normal.      Palpations: Abdomen is soft. Musculoskeletal:      Cervical back: Normal range of motion and neck supple. Skin:     General: Skin is warm and dry. Capillary Refill: Capillary refill takes less than 2 seconds. Neurological:      General: No focal deficit present. Mental Status: She is alert and oriented to person, place, and time.    Psychiatric:         Mood and Affect: Mood normal.     Current Facility-Administered Medications   Medication Dose Route Frequency    [START ON 12/17/2022] azithromycin (ZITHROMAX) 500 mg in 0.9% sodium chloride 250 mL ([id number])  500 mg IntraVENous Q24H    [START ON 12/17/2022] cefTRIAXone (ROCEPHIN) 2 g in 0.9% sodium chloride (MBP/ADV) 50 mL MBP  2 g IntraVENous Q24H    propranoloL (INDERAL) tablet 20 mg  20 mg Oral BID    sodium chloride (NS) flush 5-10 mL  5-10 mL IntraVENous PRN    sodium chloride (NS) flush 5-40 mL  5-40 mL IntraVENous Q8H    sodium chloride (NS) flush 5-40 mL  5-40 mL IntraVENous PRN    polyethylene glycol (MIRALAX) packet 17 g  17 g Oral DAILY PRN    ondansetron (ZOFRAN ODT) tablet 4 mg  4 mg Oral Q8H PRN    Or    ondansetron (ZOFRAN) injection 4 mg  4 mg IntraVENous Q6H PRN    enoxaparin (LOVENOX) injection 40 mg  40 mg SubCUTAneous DAILY    aspirin delayed-release tablet 81 mg  81 mg Oral DAILY    baricitinib (OLUMIANT) tablet 2 mg (Patient Supplied)  2 mg Oral QPM    pantoprazole (PROTONIX) tablet 40 mg  40 mg Oral ACB    0.9% sodium chloride infusion  75 mL/hr IntraVENous CONTINUOUS    insulin lispro (HUMALOG) injection   SubCUTAneous AC&HS    glucose chewable tablet 16 g  4 Tablet Oral PRN    glucagon (GLUCAGEN) injection 1 mg  1 mg IntraMUSCular PRN    acetaminophen (TYLENOL) tablet 650 mg  650 mg Oral Q6H PRN    [START ON 12/19/2022] methotrexate (RHEUMATREX) tablet 15 mg (Patient Supplied)  15 mg Oral every Monday    trospium (SANCTURA) tablet 20 mg  20 mg Oral BID WITH MEALS    dextrose 10% infusion 125-250 mL  125-250 mL IntraVENous PRN    folic acid tablet 0.4 mg (Patient Supplied)  0.4 mg Oral QPM        Assessment/Plan:     Sepsis  -improving, tachycardia resolved, and no overnight fevers  -noted leukocytosis: WBC 15.3, down from 17.2 previous day  -patient weaned from 2 L and is stable on RA  -CXR: findings suggestive of left basilar infiltrate  -continue gentle hydration for now with plans to discontinue once lactic acid has trended  -Influenza B positive  -Rocephin 1 gram and Azithromycin continued, with plans to transition to oral form in the am  -Tylenol for temperature greater than 100.4  -repeat CBC in the am     Pneumonia  -continue current antibiotic regimen     Influenza B  -droplet precautions  -Tylenol for temperature < 100.4     Diabetes Mellitus 2  -holding Metformin  -implement sliding scale and POC before meals and bedtime  -diabetic diet     Rheumatoid Arthritis  -chronic  -continue Olumiant and Rheumatrex     Hypertension  -BP improved, restarting Inderal today  -monitor the patient BP and HR closely    Disposition: Home tomorrow if leukocytosis has improved    DVT Prophylaxis: Lovenox    Code Status: Full    Care Plan discussed with: Patient and nursing    Clinical time 25 minutes with >50% of visit spent in counseling and coordination of care    Signed by: HANNAH Patel 12/16/2022

## 2022-12-17 VITALS
TEMPERATURE: 97.1 F | OXYGEN SATURATION: 97 % | DIASTOLIC BLOOD PRESSURE: 64 MMHG | WEIGHT: 197 LBS | HEART RATE: 80 BPM | HEIGHT: 62 IN | RESPIRATION RATE: 20 BRPM | BODY MASS INDEX: 36.25 KG/M2 | SYSTOLIC BLOOD PRESSURE: 145 MMHG

## 2022-12-17 LAB
ANION GAP SERPL CALC-SCNC: 8 MMOL/L (ref 3–18)
BUN SERPL-MCNC: 14 MG/DL (ref 7–18)
BUN/CREAT SERPL: 18 (ref 12–20)
CA-I BLD-MCNC: 9.4 MG/DL (ref 8.5–10.1)
CHLORIDE SERPL-SCNC: 107 MMOL/L (ref 100–111)
CO2 SERPL-SCNC: 27 MMOL/L (ref 21–32)
CREAT SERPL-MCNC: 0.76 MG/DL (ref 0.6–1.3)
ERYTHROCYTE [DISTWIDTH] IN BLOOD BY AUTOMATED COUNT: 15.5 % (ref 11.6–14.5)
GLUCOSE BLD STRIP.AUTO-MCNC: 105 MG/DL (ref 70–110)
GLUCOSE BLD STRIP.AUTO-MCNC: 135 MG/DL (ref 70–110)
GLUCOSE SERPL-MCNC: 142 MG/DL (ref 74–99)
HCT VFR BLD AUTO: 30.7 % (ref 35–45)
HGB BLD-MCNC: 9.7 G/DL (ref 12–16)
MAGNESIUM SERPL-MCNC: 1.7 MG/DL (ref 1.6–2.6)
MCH RBC QN AUTO: 31.7 PG (ref 24–34)
MCHC RBC AUTO-ENTMCNC: 31.6 G/DL (ref 31–37)
MCV RBC AUTO: 100.3 FL (ref 78–100)
NRBC # BLD: 0 K/UL (ref 0–0.01)
NRBC BLD-RTO: 0 PER 100 WBC
PERFORMED BY, TECHID: ABNORMAL
PERFORMED BY, TECHID: NORMAL
PLATELET # BLD AUTO: 339 K/UL (ref 135–420)
PMV BLD AUTO: 10.4 FL (ref 9.2–11.8)
POTASSIUM SERPL-SCNC: 4.4 MMOL/L (ref 3.5–5.5)
RBC # BLD AUTO: 3.06 M/UL (ref 4.2–5.3)
SODIUM SERPL-SCNC: 142 MMOL/L (ref 136–145)
WBC # BLD AUTO: 9.6 K/UL (ref 4.6–13.2)

## 2022-12-17 PROCEDURE — 36415 COLL VENOUS BLD VENIPUNCTURE: CPT

## 2022-12-17 PROCEDURE — 74011000250 HC RX REV CODE- 250: Performed by: NURSE PRACTITIONER

## 2022-12-17 PROCEDURE — 74011000258 HC RX REV CODE- 258: Performed by: INTERNAL MEDICINE

## 2022-12-17 PROCEDURE — 82962 GLUCOSE BLOOD TEST: CPT

## 2022-12-17 PROCEDURE — 83735 ASSAY OF MAGNESIUM: CPT

## 2022-12-17 PROCEDURE — 74011250636 HC RX REV CODE- 250/636: Performed by: NURSE PRACTITIONER

## 2022-12-17 PROCEDURE — 74011250637 HC RX REV CODE- 250/637: Performed by: NURSE PRACTITIONER

## 2022-12-17 PROCEDURE — 74011250636 HC RX REV CODE- 250/636: Performed by: INTERNAL MEDICINE

## 2022-12-17 PROCEDURE — 80048 BASIC METABOLIC PNL TOTAL CA: CPT

## 2022-12-17 PROCEDURE — 85027 COMPLETE CBC AUTOMATED: CPT

## 2022-12-17 RX ORDER — AMOXICILLIN AND CLAVULANATE POTASSIUM 875; 125 MG/1; MG/1
1 TABLET, FILM COATED ORAL EVERY 12 HOURS
Qty: 10 TABLET | Refills: 0 | Status: SHIPPED | OUTPATIENT
Start: 2022-12-17 | End: 2022-12-22

## 2022-12-17 RX ADMIN — PANTOPRAZOLE SODIUM 40 MG: 40 TABLET, DELAYED RELEASE ORAL at 07:55

## 2022-12-17 RX ADMIN — Medication 81 MG: at 09:36

## 2022-12-17 RX ADMIN — SODIUM CHLORIDE, PRESERVATIVE FREE 10 ML: 5 INJECTION INTRAVENOUS at 07:59

## 2022-12-17 RX ADMIN — TROSPIUM CHLORIDE 20 MG: 20 TABLET, FILM COATED ORAL at 07:55

## 2022-12-17 RX ADMIN — PROPRANOLOL HYDROCHLORIDE 20 MG: 10 TABLET ORAL at 09:36

## 2022-12-17 RX ADMIN — CEFTRIAXONE 2 G: 2 INJECTION, POWDER, FOR SOLUTION INTRAMUSCULAR; INTRAVENOUS at 10:56

## 2022-12-17 RX ADMIN — AZITHROMYCIN 500 MG: 500 INJECTION, POWDER, LYOPHILIZED, FOR SOLUTION INTRAVENOUS at 11:04

## 2022-12-17 NOTE — DISCHARGE SUMMARY
Discharge Summary       Patient ID:  Deepak Rivera,   74 y.o., female  1950    PCP:  Arleth Melo MD    Admit Date: 12/15/2022  8:34 AM  Discharge Date:  No discharge date for patient encounter. Length of stay: 2 day(s)  Code Status: Full Code  Discharging physician: Krysta Bennett MD    Chief Complaint   Patient presents with    Fever    Vomiting    Cough    Diarrhea       Discharge Medications  Current Discharge Medication List        START taking these medications    Details   amoxicillin-clavulanate (Augmentin) 875-125 mg per tablet Take 1 Tablet by mouth every twelve (12) hours for 5 days. Qty: 10 Tablet, Refills: 0  Start date: 12/17/2022, End date: 12/22/2022           CONTINUE these medications which have NOT CHANGED    Details   simvastatin (ZOCOR) 20 mg tablet Take 20 mg by mouth nightly. baricitinib (OLUMIANT) 2 mg tablet Olumiant 2 mg tablet   Take 1 tablet every day by oral route. omeprazole (PRILOSEC) 20 mg capsule Take 1 Capsule by mouth daily. Qty: 90 Capsule, Refills: 1      metFORMIN (GLUCOPHAGE) 500 mg tablet Take 500 mg by mouth two (2) times daily (with meals). aspirin delayed-release 81 mg tablet Take 81 mg by mouth daily. propranoloL (INDERAL) 20 mg tablet Take 20 mg by mouth two (2) times a day. methotrexate (RHEUMATREX) 2.5 mg tablet Take 15 mg by mouth every Monday. oxybutynin chloride XL (DITROPAN XL) 10 mg CR tablet Take 10 mg by mouth daily. Reason For admission    Principal Problem:    Sepsis ([address] (12/15/2022)    Active Problems:    Pneumonia (12/15/2022)      Influenza B (12/15/2022)       HPI on Admission (per admitting physician):   Deepak Rivera is a 74 y.o. female with a past medical history for Hypertension, Diabetes Mellitus Type 2, rheumatoid arthritis, fatty liver disease, and sleep apnea, patient presented to the ED with a chief complaint of diarrhea.  Patient reports that the diarrhea started this am around 0400, she report x 2 loose watery stools (none since arriving to the ED). Other accompanying symptoms includes, nausea, vomiting, weakness/fatigue, fever, chills, and nonproductive cough, she denies abdominal pain, bloating, chest pain, palpitations, shortness of breath. In the ED sepsis alert was called due to tachycardia and febrile, sepsis workup initiated, lactic acid 3.3, CXR findings suggestive of left basilar infiltrate, influenza B positive. Patient received 2 L of IVF, started on IV Rocephin and Azithromycin, and placed on 2 L supplemental oxygen for an oxygen saturation of 91%. Hospital medicine consulted for admission. Patient assessed in the ED, at the bedside, patient is alert and oriented, there is no acute distress noted. Patient agrees to admission for a diagnosis of sepsis likely secondary to pneumonia, treatment to include IV hydration, IV antibiotics, and supplemental oxygen. Hospital Course and Discharge Diagnosis   The patient admitted for the following Principal Medical Problem:   Sepsis / Pneumonia  -improving, tachycardia resolved, and no overnight fevers  -noted leukocytosis: WBC 9.6 , down from 17.2 , -Influenza B positive  -patient weaned from 2 L and is stable on RA  -CXR: findings suggestive of left basilar infiltrate  -gentle hydration for now with plans to discontinue once lactic acid has trended  -Rocephin 1 gram and Azithromycin continued, with plans to transition to oral Augmentin  -Tylenol for temperature greater than 100.4  Influenza B  -droplet precautions  -Tylenol for temperature < 100.4  Diabetes Mellitus 2  -holding Metformin  -implement sliding scale and POC before meals and bedtime  -diabetic diet  Rheumatoid Arthritis  -chronic  -continue Olumiant and Rheumatrex  Hypertension  -BP improved, restarting Inderal today  -monitor the patient BP and HR closely:     The patient condition became clinically stable and No new complain or complication during the hospital course.     The Medication changes discussed with the patient and family on the discharge    Condition at discharge   Afebrile  Ambulating  Eating, Drinking, Voiding  Stable      Physical Exam on Discharge:  Visit Vitals  BP (!) 146/68   Pulse 89   Temp 97.6 °F (36.4 °C)   Resp 20   Ht 5' 2\" (1.575 m)   Wt 89.4 kg (197 lb)   SpO2 95%   BMI 36.03 kg/m²       General Appearance:   Appears in no acute distress. , Sitting up.,   Skin:   Skin warm & dry, No rash, No jaundice,   Lymph: There is no lymphadenopathy,   HEENT:   PERRLA, EOMI, Moist oral mucous membranes, conjunctiva clear,   Neck:   Supple, Without masses,   Lungs:   Clear, No wheezes. , No rales. , Normal respiratory effort,   Heart:   Regular rate and rhythm, No gallop,   Abdomen:   Soft , Non-distended, Normal bowel sounds and Non-tender,   Extremities:  no edema of legs, Normal pedal and radial pulses,   Neuro:    alert, oriented, affect appropriate, speech fluent, cranial nerves intact, no focal neurological deficits and moves all extremities well    Procedures:    No discharge procedures on file.      Consultants/Treatment Team:    Treatment Team: Attending Provider: Eliza Lawler MD; Utilization Review: Anibal Alejo RN; Primary Nurse: Leila Lambert    Disposition:    Home    Follow Up   Duane Obrien MD    Most Recent Labs:  Recent Results (from the past 24 hour(s))   LACTIC ACID    Collection Time: 12/16/22 12:15 PM   Result Value Ref Range    Lactic acid 1.1 0.4 - 2.0 mmol/L   GLUCOSE, POC    Collection Time: 12/16/22  3:15 PM   Result Value Ref Range    Glucose (POC) 128 (H) 70 - 110 mg/dL    Performed by [address] [place] [address], POC    Collection Time: 12/16/22  9:34 PM   Result Value Ref Range    Glucose (POC) 143 (H) 70 - 110 mg/dL    Performed by [address] [place] [address], BASIC    Collection Time: 12/17/22  4:15 AM   Result Value Ref Range    Sodium 142 136 - 145 mmol/L    Potassium 4.4 3.5 - 5.5 mmol/L    Chloride 107 100 - 111 mmol/L    CO2 27 21 - 32 mmol/L    Anion gap 8 3.0 - 18.0 mmol/L    Glucose 142 (H) 74 - 99 mg/dL    BUN 14 7 - 18 mg/dL    Creatinine 0.76 0.60 - 1.30 mg/dL    BUN/Creatinine ratio 18 12 - 20      eGFR >60 >60 ml/min/1.73m2    Calcium 9.4 8.5 - 10.1 mg/dL   MAGNESIUM    Collection Time: 12/17/22  4:15 AM   Result Value Ref Range    Magnesium 1.7 1.6 - 2.6 mg/dL   CBC W/O DIFF    Collection Time: 12/17/22  4:15 AM   Result Value Ref Range    WBC 9.6 4.6 - 13.2 K/uL    RBC 3.06 (L) 4.20 - 5.30 M/uL    HGB 9.7 (L) 12.0 - 16.0 g/dL    HCT 30.7 (L) 35.0 - 45.0 %    .3 (H) 78.0 - 100.0 FL    MCH 31.7 24.0 - 34.0 PG    MCHC 31.6 31.0 - 37.0 g/dL    RDW 15.5 (H) 11.6 - 14.5 %    PLATELET 159 661 - 323 K/uL    MPV 10.4 9.2 - 11.8 FL    NRBC 0.0 0.0  WBC    ABSOLUTE NRBC 0.00 0.00 - 0.01 K/uL   GLUCOSE, POC    Collection Time: 12/17/22  7:15 AM   Result Value Ref Range    Glucose (POC) 135 (H) 70 - 110 mg/dL    Performed by Meli Cole      Recent Labs     12/17/22  0415   BUN 14      CO2 27     Recent Labs     12/17/22  0415   WBC 9.6   RBC 3.06*   HCT 30.7*   .3*   MCH 31.7   MCHC 31.6   RDW 15.5*       XR Results:  Results from Hospital Encounter encounter on 12/15/22    XR CHEST PORT    Narrative  CHEST AP PORTABLE    Indication: Cough. Comparison: None. Findings: There is hazy alveolar opacity in the left lower lung zone. Remaining  lungs appear clear. The cardiac silhouette and pulmonary vascularity appear  within normal limits. No evidence for pneumothorax or pleural effusion. Impression  Findings suggestive of left basilar infiltrate. Recommend follow-up chest x-ray  to document resolution. CT Results:  No results found for this or any previous visit. MRI Results:  No results found for this or any previous visit. Nuclear Medicine Results:  No results found for this or any previous visit.         Zuleyka Antoine MD   Hospitalist

## 2022-12-17 NOTE — PROGRESS NOTES
0700- Received care of pt. Sitting up in the chair on her phone. Glucose checked. No coverage needed    0745- Brought pt breakfast with her AM medications. Pt tolerated well. No other needs at this time. 0940- Scheduled medications given per MAR. Pt tolerated well. She refused her lovenox. She said she \"told them yesterday she didn't want it. \" She also wants to know about if she's getting better, because she wants to go home. 1100- IV antibiotics hung and infusing at this time. Pt is being discharged as soon as antibiotics in finished.  is at the bedside.

## 2022-12-17 NOTE — PROGRESS NOTES
Problem: Risk for Spread of Infection  Goal: Prevent transmission of infectious organism to others  Description: Prevent the transmission of infectious organisms to other patients, staff members, and visitors.   Outcome: Progressing Towards Goal     Problem: Patient Education:  Go to Education Activity  Goal: Patient/Family Education  Outcome: Progressing Towards Goal     Problem: General Medical Care Plan  Goal: *Vital signs within specified parameters  Outcome: Progressing Towards Goal  Goal: *Labs within defined limits  Outcome: Progressing Towards Goal  Goal: *Absence of infection signs and symptoms  Outcome: Progressing Towards Goal  Goal: *Optimal pain control at patient's stated goal  Outcome: Progressing Towards Goal  Goal: *Skin integrity maintained  Outcome: Progressing Towards Goal  Goal: *Fluid volume balance  Outcome: Progressing Towards Goal  Goal: *Optimize nutritional status  Outcome: Progressing Towards Goal  Goal: *Anxiety reduced or absent  Outcome: Progressing Towards Goal  Goal: *Progressive mobility and function (eg: ADL's)  Outcome: Progressing Towards Goal     Problem: Patient Education: Go to Patient Education Activity  Goal: Patient/Family Education  Outcome: Progressing Towards Goal

## 2022-12-18 LAB
BACTERIA SPEC CULT: NORMAL
BACTERIA SPEC CULT: NORMAL
SPECIAL REQUESTS,SREQ: NORMAL
SPECIAL REQUESTS,SREQ: NORMAL

## 2022-12-28 ENCOUNTER — TRANSCRIBE ORDER (OUTPATIENT)
Dept: SCHEDULING | Age: 72
End: 2022-12-28

## 2022-12-28 DIAGNOSIS — J44.1 OBSTRUCTIVE CHRONIC BRONCHITIS WITH EXACERBATION (HCC): Primary | ICD-10-CM

## 2023-01-17 ENCOUNTER — HOSPITAL ENCOUNTER (OUTPATIENT)
Dept: PULMONOLOGY | Age: 73
Discharge: HOME | End: 2023-01-17
Attending: INTERNAL MEDICINE
Payer: MEDICARE

## 2023-01-17 VITALS — OXYGEN SATURATION: 98 %

## 2023-01-17 DIAGNOSIS — J44.1 OBSTRUCTIVE CHRONIC BRONCHITIS WITH EXACERBATION (HCC): ICD-10-CM

## 2023-01-17 LAB
ARTERIAL PATENCY WRIST A: YES
BASE DEFICIT BLDA-SCNC: 0.9 MMOL/L
BDY SITE: ABNORMAL
COHGB MFR BLD: 0.6 % (ref 1–2)
HCO3 BLDA-SCNC: 24 MMOL/L (ref 22–26)
METHGB MFR BLD: 0.3 % (ref 0–1.4)
OXYHGB MFR BLD: 94.7 % (ref 95–99)
PCO2 BLDA: 40 MMHG (ref 35–45)
PERFORMED BY, TECHID: ABNORMAL
PH BLDA: 7.4 (ref 7.35–7.45)
PO2 BLDA: 86 MMHG (ref 80–100)
SAO2 % BLD: 96 % (ref 95–99)
SAO2% DEVICE SAO2% SENSOR NAME: ABNORMAL
SPECIMEN SITE: ABNORMAL

## 2023-01-17 PROCEDURE — 94060 EVALUATION OF WHEEZING: CPT

## 2023-01-17 PROCEDURE — 74011000250 HC RX REV CODE- 250: Performed by: INTERNAL MEDICINE

## 2023-01-17 PROCEDURE — 94640 AIRWAY INHALATION TREATMENT: CPT

## 2023-01-17 PROCEDURE — 82803 BLOOD GASES ANY COMBINATION: CPT

## 2023-01-17 PROCEDURE — 94726 PLETHYSMOGRAPHY LUNG VOLUMES: CPT

## 2023-01-17 PROCEDURE — 36600 WITHDRAWAL OF ARTERIAL BLOOD: CPT

## 2023-01-17 PROCEDURE — 94729 DIFFUSING CAPACITY: CPT

## 2023-01-17 RX ORDER — ALBUTEROL SULFATE 0.83 MG/ML
1.25 SOLUTION RESPIRATORY (INHALATION) ONCE
Status: DISCONTINUED | OUTPATIENT
Start: 2023-01-17 | End: 2023-01-17

## 2023-01-17 RX ORDER — ALBUTEROL SULFATE 0.83 MG/ML
2.5 SOLUTION RESPIRATORY (INHALATION) ONCE
Status: COMPLETED | OUTPATIENT
Start: 2023-01-17 | End: 2023-01-17

## 2023-01-17 RX ADMIN — ALBUTEROL SULFATE 2.5 MG: 0.83 SOLUTION RESPIRATORY (INHALATION) at 11:32

## 2023-01-17 NOTE — PROGRESS NOTES
Patient tolerated neb and procedure well. She gave her best effort. No adverse reactions notes post the unit dose albuterol HHN. Breathsounds were clear pre and post the neb.

## 2023-03-28 ENCOUNTER — HOSPITAL ENCOUNTER (OUTPATIENT)
Age: 73
Discharge: HOME | End: 2023-03-30
Payer: MEDICARE

## 2023-03-28 DIAGNOSIS — M79.604 RIGHT LEG PAIN: ICD-10-CM

## 2023-03-28 PROCEDURE — 93971 EXTREMITY STUDY: CPT

## 2023-04-24 ENCOUNTER — HOSPITAL ENCOUNTER (OUTPATIENT)
Age: 73
Discharge: HOME | End: 2023-04-27
Payer: MEDICARE

## 2023-04-24 DIAGNOSIS — K75.81 NONALCOHOLIC STEATOHEPATITIS: ICD-10-CM

## 2023-04-24 PROCEDURE — 82105 ALPHA-FETOPROTEIN SERUM: CPT

## 2023-04-24 PROCEDURE — 36415 COLL VENOUS BLD VENIPUNCTURE: CPT

## 2023-04-26 LAB — AFP-TM SERPL-MCNC: 4 NG/ML (ref 0–9.2)

## 2023-04-27 ENCOUNTER — HOSPITAL ENCOUNTER (OUTPATIENT)
Age: 73
Discharge: HOME | End: 2023-04-27
Payer: MEDICARE

## 2023-04-27 DIAGNOSIS — K75.81 NONALCOHOLIC STEATOHEPATITIS (NASH): ICD-10-CM

## 2023-04-27 PROCEDURE — 76705 ECHO EXAM OF ABDOMEN: CPT

## 2023-05-26 ENCOUNTER — TRANSCRIBE ORDERS (OUTPATIENT)
Facility: HOSPITAL | Age: 73
End: 2023-05-26

## 2023-05-26 DIAGNOSIS — N39.0 RECURRENT UTI: Primary | ICD-10-CM

## 2023-06-05 ENCOUNTER — HOSPITAL ENCOUNTER (OUTPATIENT)
Age: 73
Discharge: HOME | End: 2023-06-08
Attending: INTERNAL MEDICINE
Payer: MEDICARE

## 2023-06-05 DIAGNOSIS — N39.0 RECURRENT UTI: ICD-10-CM

## 2023-06-05 PROCEDURE — 76770 US EXAM ABDO BACK WALL COMP: CPT

## 2023-10-30 RX ORDER — REGADENOSON 0.08 MG/ML
0.4 INJECTION, SOLUTION INTRAVENOUS
Status: COMPLETED | OUTPATIENT
Start: 2023-10-31 | End: 2023-10-31

## 2023-10-30 RX ORDER — CAFFEINE CITRATE 20 MG/ML
60 SOLUTION INTRAVENOUS ONCE
Status: DISCONTINUED | OUTPATIENT
Start: 2023-10-31 | End: 2023-11-03 | Stop reason: HOSPADM

## 2023-10-31 ENCOUNTER — HOSPITAL ENCOUNTER (OUTPATIENT)
Age: 73
Discharge: HOME | End: 2023-11-03
Attending: INTERNAL MEDICINE
Payer: MEDICARE

## 2023-10-31 ENCOUNTER — HOSPITAL ENCOUNTER (OUTPATIENT)
Age: 73
Discharge: HOME | End: 2023-11-02
Attending: INTERNAL MEDICINE
Payer: MEDICARE

## 2023-10-31 VITALS
WEIGHT: 183 LBS | BODY MASS INDEX: 33.68 KG/M2 | DIASTOLIC BLOOD PRESSURE: 63 MMHG | HEIGHT: 62 IN | SYSTOLIC BLOOD PRESSURE: 141 MMHG

## 2023-10-31 DIAGNOSIS — R07.9 CHEST PAIN, UNSPECIFIED TYPE: ICD-10-CM

## 2023-10-31 DIAGNOSIS — I42.1 HYPERTROPHIC OBSTRUCTIVE CARDIOMYOPATHY (HCC): ICD-10-CM

## 2023-10-31 LAB
ECHO AO ASC DIAM: 3 CM
ECHO AO ASCENDING AORTA INDEX: 1.63 CM/M2
ECHO AO ROOT DIAM: 3 CM
ECHO AO ROOT INDEX: 1.63 CM/M2
ECHO AV AREA PEAK VELOCITY: 3 CM2
ECHO AV AREA VTI: 3.4 CM2
ECHO AV AREA/BSA PEAK VELOCITY: 1.6 CM2/M2
ECHO AV AREA/BSA VTI: 1.8 CM2/M2
ECHO AV MEAN GRADIENT: 3 MMHG
ECHO AV MEAN VELOCITY: 0.8 M/S
ECHO AV PEAK GRADIENT: 5 MMHG
ECHO AV PEAK VELOCITY: 1.2 M/S
ECHO AV VELOCITY RATIO: 0.75
ECHO AV VTI: 24.7 CM
ECHO BSA: 1.91 M2
ECHO BSA: 1.91 M2
ECHO EST RA PRESSURE: 3 MMHG
ECHO IVC PROX: 1.9 CM
ECHO LA AREA 2C: 16.9 CM2
ECHO LA AREA 4C: 20 CM2
ECHO LA DIAMETER INDEX: 2.07 CM/M2
ECHO LA DIAMETER: 3.8 CM
ECHO LA MAJOR AXIS: 5.5 CM
ECHO LA MINOR AXIS: 5.1 CM
ECHO LA TO AORTIC ROOT RATIO: 1.27
ECHO LA VOL 2C: 46 ML (ref 22–52)
ECHO LA VOL 4C: 59 ML (ref 22–52)
ECHO LA VOL BP: 54 ML (ref 22–52)
ECHO LA VOL/BSA BIPLANE: 29 ML/M2 (ref 16–34)
ECHO LA VOLUME INDEX A2C: 25 ML/M2 (ref 16–34)
ECHO LA VOLUME INDEX A4C: 32 ML/M2 (ref 16–34)
ECHO LV E' LATERAL VELOCITY: 9 CM/S
ECHO LV E' SEPTAL VELOCITY: 9 CM/S
ECHO LV EDV A2C: 30 ML
ECHO LV EDV A4C: 38 ML
ECHO LV EDV INDEX A4C: 21 ML/M2
ECHO LV EDV NDEX A2C: 16 ML/M2
ECHO LV EJECTION FRACTION A2C: 60 %
ECHO LV EJECTION FRACTION A4C: 62 %
ECHO LV EJECTION FRACTION BIPLANE: 60 % (ref 55–100)
ECHO LV ESV A2C: 12 ML
ECHO LV ESV A4C: 14 ML
ECHO LV ESV INDEX A2C: 7 ML/M2
ECHO LV ESV INDEX A4C: 8 ML/M2
ECHO LV FRACTIONAL SHORTENING: 33 % (ref 28–44)
ECHO LV INTERNAL DIMENSION DIASTOLE INDEX: 2.28 CM/M2
ECHO LV INTERNAL DIMENSION DIASTOLIC: 4.2 CM (ref 3.9–5.3)
ECHO LV INTERNAL DIMENSION SYSTOLIC INDEX: 1.52 CM/M2
ECHO LV INTERNAL DIMENSION SYSTOLIC: 2.8 CM
ECHO LV IVSD: 0.9 CM (ref 0.6–0.9)
ECHO LV MASS 2D: 127.8 G (ref 67–162)
ECHO LV MASS INDEX 2D: 69.5 G/M2 (ref 43–95)
ECHO LV POSTERIOR WALL DIASTOLIC: 1 CM (ref 0.6–0.9)
ECHO LV RELATIVE WALL THICKNESS RATIO: 0.48
ECHO LVOT AREA: 3.8 CM2
ECHO LVOT AV VTI INDEX: 0.9
ECHO LVOT DIAM: 2.2 CM
ECHO LVOT MEAN GRADIENT: 2 MMHG
ECHO LVOT PEAK GRADIENT: 3 MMHG
ECHO LVOT PEAK VELOCITY: 0.9 M/S
ECHO LVOT STROKE VOLUME INDEX: 45.8 ML/M2
ECHO LVOT SV: 84.3 ML
ECHO LVOT VTI: 22.2 CM
ECHO MV A VELOCITY: 0.61 M/S
ECHO MV AREA VTI: 4.7 CM2
ECHO MV E DECELERATION TIME (DT): 333 MS
ECHO MV E VELOCITY: 0.66 M/S
ECHO MV E/A RATIO: 1.08
ECHO MV E/E' LATERAL: 7.33
ECHO MV E/E' RATIO (AVERAGED): 7.33
ECHO MV E/E' SEPTAL: 7.33
ECHO MV LVOT VTI INDEX: 0.8
ECHO MV MAX VELOCITY: 0.8 M/S
ECHO MV MEAN GRADIENT: 1 MMHG
ECHO MV MEAN VELOCITY: 0.4 M/S
ECHO MV PEAK GRADIENT: 2 MMHG
ECHO MV VTI: 17.8 CM
ECHO PV MAX VELOCITY: 0.8 M/S
ECHO PV PEAK GRADIENT: 3 MMHG
ECHO RA AREA 4C: 11.6 CM2
ECHO RA END SYSTOLIC VOLUME APICAL 4 CHAMBER INDEX BSA: 14 ML/M2
ECHO RA VOLUME: 25 ML
ECHO RIGHT VENTRICULAR SYSTOLIC PRESSURE (RVSP): 29 MMHG
ECHO RV BASAL DIMENSION: 3.3 CM
ECHO RV LONGITUDINAL DIMENSION: 6.2 CM
ECHO RV MID DIMENSION: 2.4 CM
ECHO RV TAPSE: 2 CM (ref 1.7–?)
ECHO TV REGURGITANT MAX VELOCITY: 2.55 M/S
ECHO TV REGURGITANT PEAK GRADIENT: 26 MMHG
STRESS BASELINE ST DEPRESSION: 0 MM
STRESS ESTIMATED WORKLOAD: 1 METS
STRESS PEAK DIAS BP: 58 MMHG
STRESS PEAK SYS BP: 146 MMHG
STRESS PERCENT HR ACHIEVED: 81 %
STRESS POST PEAK HR: 118 BPM
STRESS RATE PRESSURE PRODUCT: NORMAL BPM*MMHG
STRESS ST DEPRESSION: 0 MM
STRESS TARGET HR: 145 BPM

## 2023-10-31 PROCEDURE — 6360000002 HC RX W HCPCS: Performed by: INTERNAL MEDICINE

## 2023-10-31 PROCEDURE — 93306 TTE W/DOPPLER COMPLETE: CPT

## 2023-10-31 PROCEDURE — 78452 HT MUSCLE IMAGE SPECT MULT: CPT

## 2023-10-31 PROCEDURE — A9500 TC99M SESTAMIBI: HCPCS | Performed by: INTERNAL MEDICINE

## 2023-10-31 PROCEDURE — 3430000000 HC RX DIAGNOSTIC RADIOPHARMACEUTICAL: Performed by: INTERNAL MEDICINE

## 2023-10-31 PROCEDURE — 93017 CV STRESS TEST TRACING ONLY: CPT

## 2023-10-31 RX ORDER — MAGNESIUM GLUCONATE 27 MG(500)
500 TABLET ORAL 2 TIMES DAILY
COMMUNITY

## 2023-10-31 RX ORDER — SIMVASTATIN 20 MG
20 TABLET ORAL NIGHTLY
COMMUNITY

## 2023-10-31 RX ORDER — PROPRANOLOL HYDROCHLORIDE 20 MG/1
20 TABLET ORAL 2 TIMES DAILY
COMMUNITY

## 2023-10-31 RX ORDER — LANOLIN ALCOHOL/MO/W.PET/CERES
400 CREAM (GRAM) TOPICAL DAILY
COMMUNITY

## 2023-10-31 RX ORDER — TETRAKIS(2-METHOXYISOBUTYLISOCYANIDE)COPPER(I) TETRAFLUOROBORATE 1 MG/ML
10 INJECTION, POWDER, LYOPHILIZED, FOR SOLUTION INTRAVENOUS
Status: COMPLETED | OUTPATIENT
Start: 2023-10-31 | End: 2023-10-31

## 2023-10-31 RX ORDER — OXYBUTYNIN CHLORIDE 10 MG/1
20 TABLET, EXTENDED RELEASE ORAL DAILY
COMMUNITY

## 2023-10-31 RX ORDER — TETRAKIS(2-METHOXYISOBUTYLISOCYANIDE)COPPER(I) TETRAFLUOROBORATE 1 MG/ML
34 INJECTION, POWDER, LYOPHILIZED, FOR SOLUTION INTRAVENOUS
Status: COMPLETED | OUTPATIENT
Start: 2023-10-31 | End: 2023-10-31

## 2023-10-31 RX ADMIN — TECHNETIUM TC-99M SESTAMIBI 10 MILLICURIE: 1 INJECTION INTRAVENOUS at 07:33

## 2023-10-31 RX ADMIN — TECHNETIUM TC-99M SESTAMIBI 34 MILLICURIE: 1 INJECTION INTRAVENOUS at 09:20

## 2023-10-31 RX ADMIN — REGADENOSON 0.4 MG: 0.08 INJECTION, SOLUTION INTRAVENOUS at 09:15

## 2024-05-21 ENCOUNTER — HOSPITAL ENCOUNTER (OUTPATIENT)
Age: 74
Discharge: HOME | End: 2024-05-24
Payer: MEDICARE

## 2024-05-21 ENCOUNTER — TRANSCRIBE ORDERS (OUTPATIENT)
Age: 74
End: 2024-05-21

## 2024-05-21 DIAGNOSIS — K75.81 NONALCOHOLIC STEATOHEPATITIS: ICD-10-CM

## 2024-05-21 DIAGNOSIS — K75.81 NONALCOHOLIC STEATOHEPATITIS: Primary | ICD-10-CM

## 2024-05-21 LAB
ALBUMIN SERPL-MCNC: 3.3 G/DL (ref 3.4–5)
ALBUMIN/GLOB SERPL: 0.8 (ref 0.8–1.7)
ALP SERPL-CCNC: 78 U/L (ref 45–117)
ALT SERPL-CCNC: 58 U/L (ref 13–56)
AST SERPL W P-5'-P-CCNC: 40 U/L (ref 10–38)
BILIRUB DIRECT SERPL-MCNC: 0.1 MG/DL (ref 0–0.2)
BILIRUB SERPL-MCNC: 0.3 MG/DL (ref 0.2–1)
GLOBULIN SER CALC-MCNC: 4.1 G/DL (ref 2–4)
INR PPP: 1 (ref 0.9–1.1)
PROT SERPL-MCNC: 7.4 G/DL (ref 6.4–8.2)
PROTHROMBIN TIME: 13.8 SEC (ref 11.9–14.7)

## 2024-05-21 PROCEDURE — 82105 ALPHA-FETOPROTEIN SERUM: CPT

## 2024-05-21 PROCEDURE — 80076 HEPATIC FUNCTION PANEL: CPT

## 2024-05-21 PROCEDURE — 36415 COLL VENOUS BLD VENIPUNCTURE: CPT

## 2024-05-21 PROCEDURE — 85610 PROTHROMBIN TIME: CPT

## 2024-05-23 LAB — AFP-TM SERPL-MCNC: 4.5 NG/ML (ref 0–9.2)

## 2024-06-21 ENCOUNTER — HOSPITAL ENCOUNTER (OUTPATIENT)
Age: 74
End: 2024-06-21
Attending: INTERNAL MEDICINE
Payer: MEDICARE

## 2024-06-21 DIAGNOSIS — K42.9 CONGENITAL UMBILICAL HERNIA: ICD-10-CM

## 2024-06-21 PROCEDURE — 2500000003 HC RX 250 WO HCPCS: Performed by: INTERNAL MEDICINE

## 2024-06-21 PROCEDURE — 74177 CT ABD & PELVIS W/CONTRAST: CPT

## 2024-06-21 PROCEDURE — 6360000004 HC RX CONTRAST MEDICATION: Performed by: INTERNAL MEDICINE

## 2024-06-21 RX ADMIN — IOPAMIDOL 96 ML: 755 INJECTION, SOLUTION INTRAVENOUS at 11:10

## 2024-06-21 RX ADMIN — BARIUM SULFATE 450 ML: 21 SUSPENSION ORAL at 09:30

## 2024-08-08 ENCOUNTER — HOSPITAL ENCOUNTER (OUTPATIENT)
Age: 74
Discharge: HOME | End: 2024-08-08
Attending: INTERNAL MEDICINE
Payer: MEDICARE

## 2024-08-08 VITALS — HEIGHT: 61 IN | BODY MASS INDEX: 32.66 KG/M2 | WEIGHT: 173 LBS

## 2024-08-08 DIAGNOSIS — Z12.31 SCREENING MAMMOGRAM FOR HIGH-RISK PATIENT: ICD-10-CM

## 2024-08-08 PROCEDURE — 77063 BREAST TOMOSYNTHESIS BI: CPT

## 2024-12-10 ENCOUNTER — HOSPITAL ENCOUNTER (OUTPATIENT)
Age: 74
Discharge: HOME | End: 2024-12-12
Attending: INTERNAL MEDICINE
Payer: MEDICARE

## 2024-12-10 VITALS
DIASTOLIC BLOOD PRESSURE: 90 MMHG | SYSTOLIC BLOOD PRESSURE: 118 MMHG | BODY MASS INDEX: 32.66 KG/M2 | HEIGHT: 61 IN | WEIGHT: 173 LBS

## 2024-12-10 DIAGNOSIS — R00.2 PALPITATIONS: ICD-10-CM

## 2024-12-10 DIAGNOSIS — I34.1 MVP (MITRAL VALVE PROLAPSE): ICD-10-CM

## 2024-12-10 LAB
ECHO AO ASC DIAM: 2.7 CM
ECHO AO ASCENDING AORTA INDEX: 1.52 CM/M2
ECHO AO ROOT DIAM: 3.1 CM
ECHO AO ROOT INDEX: 1.74 CM/M2
ECHO AV AREA PEAK VELOCITY: 2 CM2
ECHO AV AREA VTI: 2.1 CM2
ECHO AV AREA/BSA PEAK VELOCITY: 1.1 CM2/M2
ECHO AV AREA/BSA VTI: 1.2 CM2/M2
ECHO AV MEAN GRADIENT: 4 MMHG
ECHO AV MEAN VELOCITY: 0.9 M/S
ECHO AV PEAK GRADIENT: 7 MMHG
ECHO AV PEAK VELOCITY: 1.3 M/S
ECHO AV VELOCITY RATIO: 0.69
ECHO AV VTI: 27.9 CM
ECHO BSA: 1.84 M2
ECHO EST RA PRESSURE: 3 MMHG
ECHO LA DIAMETER INDEX: 1.97 CM/M2
ECHO LA DIAMETER: 3.5 CM
ECHO LA TO AORTIC ROOT RATIO: 1.13
ECHO LA VOL A-L A2C: 46 ML (ref 22–52)
ECHO LA VOL A-L A4C: 57 ML (ref 22–52)
ECHO LA VOL BP: 48 ML (ref 22–52)
ECHO LA VOL MOD A2C: 43 ML (ref 22–52)
ECHO LA VOL MOD A4C: 49 ML (ref 22–52)
ECHO LA VOL/BSA BIPLANE: 27 ML/M2 (ref 16–34)
ECHO LA VOLUME AREA LENGTH: 54 ML
ECHO LA VOLUME INDEX A-L A2C: 26 ML/M2 (ref 16–34)
ECHO LA VOLUME INDEX A-L A4C: 32 ML/M2 (ref 16–34)
ECHO LA VOLUME INDEX AREA LENGTH: 30 ML/M2 (ref 16–34)
ECHO LA VOLUME INDEX MOD A2C: 24 ML/M2 (ref 16–34)
ECHO LA VOLUME INDEX MOD A4C: 28 ML/M2 (ref 16–34)
ECHO LV E' LATERAL VELOCITY: 8.17 CM/S
ECHO LV E' SEPTAL VELOCITY: 7.71 CM/S
ECHO LV EF PHYSICIAN: 60 %
ECHO LV EJECTION FRACTION A2C: 56 %
ECHO LV EJECTION FRACTION A4C: 55 %
ECHO LV EJECTION FRACTION BIPLANE: 56 % (ref 55–100)
ECHO LV FRACTIONAL SHORTENING: 31 % (ref 28–44)
ECHO LV INTERNAL DIMENSION DIASTOLE INDEX: 1.97 CM/M2
ECHO LV INTERNAL DIMENSION DIASTOLIC: 3.5 CM (ref 3.9–5.3)
ECHO LV INTERNAL DIMENSION SYSTOLIC INDEX: 1.35 CM/M2
ECHO LV INTERNAL DIMENSION SYSTOLIC: 2.4 CM
ECHO LV IVSD: 1.1 CM (ref 0.6–0.9)
ECHO LV MASS 2D: 111 G (ref 67–162)
ECHO LV MASS INDEX 2D: 62.4 G/M2 (ref 43–95)
ECHO LV POSTERIOR WALL DIASTOLIC: 1 CM (ref 0.6–0.9)
ECHO LV RELATIVE WALL THICKNESS RATIO: 0.57
ECHO LVOT AREA: 2.8 CM2
ECHO LVOT AV VTI INDEX: 0.71
ECHO LVOT DIAM: 1.9 CM
ECHO LVOT MEAN GRADIENT: 2 MMHG
ECHO LVOT PEAK GRADIENT: 3 MMHG
ECHO LVOT PEAK VELOCITY: 0.9 M/S
ECHO LVOT STROKE VOLUME INDEX: 31.7 ML/M2
ECHO LVOT SV: 56.4 ML
ECHO LVOT VTI: 19.9 CM
ECHO MAIN PULMONARY ARTERY DIAMETER: 1.8 CM
ECHO MV A VELOCITY: 0.57 M/S
ECHO MV AREA VTI: 2.6 CM2
ECHO MV E DECELERATION TIME (DT): 148 MS
ECHO MV E VELOCITY: 0.65 M/S
ECHO MV E/A RATIO: 1.14
ECHO MV E/E' LATERAL: 7.96
ECHO MV E/E' RATIO (AVERAGED): 8.19
ECHO MV E/E' SEPTAL: 8.43
ECHO MV LVOT VTI INDEX: 1.1
ECHO MV MAX VELOCITY: 0.7 M/S
ECHO MV MEAN GRADIENT: 1 MMHG
ECHO MV MEAN VELOCITY: 0.5 M/S
ECHO MV PEAK GRADIENT: 2 MMHG
ECHO MV VTI: 21.9 CM
ECHO PV MAX VELOCITY: 0.9 M/S
ECHO PV MEAN GRADIENT: 2 MMHG
ECHO PV MEAN VELOCITY: 0.6 M/S
ECHO PV PEAK GRADIENT: 3 MMHG
ECHO RA END SYSTOLIC VOLUME APICAL 4 CHAMBER INDEX BSA: 10 ML/M2
ECHO RA VOLUME BIPLANE METHOD OF DISKS: 17 ML
ECHO RA VOLUME INDEX BP: 10 ML/M2
ECHO RA VOLUME: 17 ML
ECHO RIGHT VENTRICULAR SYSTOLIC PRESSURE (RVSP): 26 MMHG
ECHO RV FRACTIONAL AREA CHANGE: 42 %
ECHO RV TAPSE: 1.9 CM (ref 1.7–?)
ECHO TV REGURGITANT MAX VELOCITY: 2.39 M/S
ECHO TV REGURGITANT PEAK GRADIENT: 23 MMHG

## 2024-12-10 PROCEDURE — 93306 TTE W/DOPPLER COMPLETE: CPT

## 2024-12-10 PROCEDURE — 93225 XTRNL ECG REC<48 HRS REC: CPT

## 2024-12-13 LAB — ECHO BSA: 1.84 M2

## 2025-08-18 ENCOUNTER — HOSPITAL ENCOUNTER (OUTPATIENT)
Age: 75
Discharge: HOME | End: 2025-08-21
Attending: INTERNAL MEDICINE
Payer: MEDICARE

## 2025-08-18 VITALS — BODY MASS INDEX: 32.66 KG/M2 | WEIGHT: 173 LBS | HEIGHT: 61 IN

## 2025-08-18 DIAGNOSIS — Z12.31 OTHER SCREENING MAMMOGRAM: ICD-10-CM

## 2025-08-18 PROCEDURE — 77063 BREAST TOMOSYNTHESIS BI: CPT
